# Patient Record
Sex: FEMALE | Race: WHITE | NOT HISPANIC OR LATINO | Employment: UNEMPLOYED | URBAN - METROPOLITAN AREA
[De-identification: names, ages, dates, MRNs, and addresses within clinical notes are randomized per-mention and may not be internally consistent; named-entity substitution may affect disease eponyms.]

---

## 2017-01-04 ENCOUNTER — HOSPITAL ENCOUNTER (EMERGENCY)
Facility: HOSPITAL | Age: 27
Discharge: HOME | End: 2017-01-04
Payer: MEDICAID

## 2017-01-04 VITALS
SYSTOLIC BLOOD PRESSURE: 131 MMHG | OXYGEN SATURATION: 100 % | RESPIRATION RATE: 20 BRPM | HEIGHT: 66 IN | BODY MASS INDEX: 17.68 KG/M2 | HEART RATE: 99 BPM | TEMPERATURE: 98 F | WEIGHT: 110 LBS | DIASTOLIC BLOOD PRESSURE: 74 MMHG

## 2017-01-04 DIAGNOSIS — L29.9 PRURITIC CONDITION: ICD-10-CM

## 2017-01-04 DIAGNOSIS — T50.905A ADVERSE REACTION TO DRUG, INITIAL ENCOUNTER: Primary | ICD-10-CM

## 2017-01-04 PROCEDURE — 99283 EMERGENCY DEPT VISIT LOW MDM: CPT

## 2017-01-04 NOTE — ED AVS SNAPSHOT
[facility] [address] [place] [address]               Noemi Morales   2017 11:53 AM   ED    Description:  Female : 1990   Department:  [facility]           Your Care was Coordinated By:     Provider Role From To    Roshan Perez PA-C Physician Assistant [phone] --      Reason for Visit     Allergic Reaction           Diagnoses this Visit        Comments    Adverse reaction to drug, initial encounter    -  Primary     Pruritic condition           ED Disposition     ED Disposition Condition Comment    Discharge             To Do List           Follow-up Information     Follow up with [facility].    Specialty:  Emergency Medicine    Why:  If symptoms worsen in any way. Do not take any medication that you may be allergic to. Follow up with your OB/GYN clinic in the next 1-2 days for re-evaluation and further management. Take Benadryl as directed as needed.     Contact information:    [address] [place] [address] [id number] [phone]      [facility]     [facility] -  Assistance  Registered nurses in the [facility] provide clinical advisement, health education, appointment booking, and other advisory services.  Call for this free service at [phone].             Medications           Message regarding Medications     Verify the changes and/or additions to your medication regime listed below are the same as discussed with your clinician today.  If any of these changes or additions are incorrect, please notify your healthcare provider.        STOP taking these medications     acetaminophen-codeine 300-30mg (TYLENOL #3) 300-30 mg Tab Take 1 tablet by mouth every 6 (six) hours as needed.    misoprostol (CYTOTEC) 200 MCG Tab Take 2 tablets (400 mcg total) by mouth every 4 (four) hours.    oxycodone-acetaminophen (PERCOCET) 5-325 mg per  "tablet Take 1 tablet by mouth every 4 (four) hours as needed for Pain.    promethazine (PHENERGAN) 25 MG tablet Take 1 tablet (25 mg total) by mouth every 4 (four) hours as needed for Nausea.           Verify that the below list of medications is an accurate representation of the medications you are currently taking.  If none reported, the list may be blank. If incorrect, please contact your healthcare provider. Carry this list with you in case of emergency.           Current Medications     lamotrigine (LAMICTAL) 200 MG tablet Take 1 tablet (200 mg total) by mouth 2 (two) times daily.    naproxen (NAPROSYN) 500 MG tablet Take 1 tablet (500 mg total) by mouth 2 (two) times daily with meals.           Clinical Reference Information           Your Vitals Were     BP Pulse Temp Resp Height Weight    131/74 (BP Location: Right arm, Patient Position: Sitting) 99 98.1 °F (36.7 °C) (Oral) 20 5' 6" (1.676 m) 49.9 kg (110 lb)    Last Period SpO2 BMI          10/08/2016 100% 17.75 kg/m2        Allergies as of 1/4/2017        Reactions    Sulfa (Sulfonamide Antibiotics) Rash      Immunizations Administered on Date of Encounter - 1/4/2017     None      ED Micro, Lab, POCT     None      ED Imaging Orders     None      Discharge References/Attachments     ALLERGIC REACTION, DRUG (ENGLISH)    HIVES (URTICARIA) UNDERSTANDING (ENGLISH)      Smoking Cessation     If you would like to quit smoking:   You may be eligible for free services if you are a [place] resident and started smoking cigarettes before September 1, 1988.  Call the Smoking Cessation [employer] ([facility]) toll free at [phone] [phone] or [phone] [phone].   Call 5-366-QUIT-NOW if you do not meet the above criteria.             [facility]  complies with applicable Federal civil rights laws and does not discriminate on the basis of race, color, national origin, age, disability, or sex.        Language Assistance Services     ATTENTION: Language assistance services " are available, free of charge. Please call [phone].      ATENCIÓN: Si habla español, tiene a vincent disposición servicios gratuitos de asistencia lingüística. Llame al [phone].     CHÚ Ý: N?u b?n nói Ti?ng Vi?t, có các d?ch v? h? tr? ngôn ng? mi?n phí dành cho b?n. G?i s? [phone].

## 2017-01-04 NOTE — ED NOTES
Pt examined by alla Henderson without RN, educated on prescriptions, given discharge instructions and discharged to [facility].  See provider notes for exam

## 2017-01-04 NOTE — ED PROVIDER NOTES
"Encounter Date: 2017       History     Chief Complaint   Patient presents with    Allergic Reaction     Pt states, "I think I'm having a reaction to a medication they gave me".     Review of patient's allergies indicates:   Allergen Reactions    Sulfa (sulfonamide antibiotics) Rash     HPI Comments: The patient presents to the ER c/o itching and hives. She states that the symptoms began last night after she took Naproxen for the first time. She states that she is taking Naproxen as prescribed for cramping s/p D & C on 16. She states that the itching was to her trunk, buttocks, and to her lower extremities. She states that she took Benadryl x 2 today and her symptoms have improved. She denies any swelling to her lips, face, tongue, or throat.     Past Medical History   Diagnosis Date    Bipolar 1 disorder     Scoliosis      No past medical history pertinent negatives.  Past Surgical History   Procedure Laterality Date     section  10/20/2015    Dilation and curettage of uterus       Family History   Problem Relation Age of Onset    No Known Problems Mother     Breast cancer Neg Hx     Colon cancer Neg Hx     Ovarian cancer Neg Hx      Social History   Substance Use Topics    Smoking status: Current Every Day Smoker    Smokeless tobacco: Never Used    Alcohol use No     Review of Systems   Constitutional: Negative for activity change, chills, diaphoresis and fever.   HENT: Negative for drooling, facial swelling, sore throat, trouble swallowing and voice change.    Eyes: Negative for discharge, redness, itching and visual disturbance.   Respiratory: Negative for apnea, cough, chest tightness, shortness of breath, wheezing and stridor.    Cardiovascular: Negative for chest pain, palpitations and leg swelling.   Gastrointestinal: Negative for abdominal pain, diarrhea, nausea and vomiting.   Genitourinary: Negative for decreased urine volume, menstrual problem and pelvic pain. "   Musculoskeletal: Negative for arthralgias, gait problem and myalgias.   Skin: Positive for rash.   Allergic/Immunologic: Negative for immunocompromised state.   Neurological: Negative for dizziness, tremors, seizures, syncope, speech difficulty, weakness, light-headedness and headaches.   Psychiatric/Behavioral: Negative for agitation, behavioral problems, confusion and suicidal ideas. The patient is not nervous/anxious.        Physical Exam   Initial Vitals   BP Pulse Resp Temp SpO2   01/04/17 1147 01/04/17 1147 01/04/17 1147 01/04/17 1147 01/04/17 1147   131/74 99 20 98.1 °F (36.7 °C) 100 %     Physical Exam    Nursing note and vitals reviewed.  Constitutional: She appears well-developed and well-nourished. She is not diaphoretic. No distress.   HENT:   Mouth/Throat: Oropharynx is clear and moist.   No angioedema. Normal speech. Patent airway.    Eyes: Conjunctivae are normal. Pupils are equal, round, and reactive to light. Right eye exhibits no discharge. Left eye exhibits no discharge.   Sclera white. No periorbital swelling.    Cardiovascular: Normal rate, regular rhythm and intact distal pulses.   Pulmonary/Chest: Breath sounds normal. No respiratory distress. She has no wheezes.   Abdominal: Soft. There is no tenderness. There is no guarding.   Musculoskeletal: Normal range of motion. She exhibits no edema.   Neurological: She is alert and oriented to person, place, and time. She has normal strength. No cranial nerve deficit.   Skin: Skin is warm and dry. No rash noted.   Donna DOWNEY present throughout entire exam as a chaperone. Patient has no visible or palpable rash, hives, cellulitis, urticaria to lower extremities, torso, back, or buttock. Normal skin exam. No excoriation. No swelling. Non-tender. No edema.    Psychiatric: She has a normal mood and affect. Her behavior is normal.         ED Course   Procedures  Labs Reviewed - No data to display          Medical Decision Making:   Initial Assessment:   Pt  reports itching and hives since last night after taking Naproxen. She states that her itching and rash improved after taking Benadryl. No angioedema. No rash presently.   ED Management:  I advised the patient to DC Naproxen.   I advised the patient to take Tylenol as directed as needed for pain.   I advised the patient to take Benadryl as directed as needed for itching and allergies.   I advised the patient to follow up closely with her OB clinic for re-evaluation and further management.   She agrees to return to the ER if worse in any way.                    ED Course     Clinical Impression:   The primary encounter diagnosis was Adverse reaction to drug, initial encounter. A diagnosis of Pruritic condition was also pertinent to this visit.    Disposition:   Disposition: Discharged  Condition: Stable       Roshan Perez PA-C  01/04/17 121

## 2017-01-12 ENCOUNTER — HOSPITAL ENCOUNTER (EMERGENCY)
Facility: HOSPITAL | Age: 27
Discharge: HOME | End: 2017-01-12
Payer: MEDICAID

## 2017-01-12 VITALS
HEART RATE: 71 BPM | WEIGHT: 110 LBS | BODY MASS INDEX: 17.68 KG/M2 | RESPIRATION RATE: 14 BRPM | DIASTOLIC BLOOD PRESSURE: 68 MMHG | SYSTOLIC BLOOD PRESSURE: 107 MMHG | OXYGEN SATURATION: 100 % | TEMPERATURE: 98 F | HEIGHT: 66 IN

## 2017-01-12 DIAGNOSIS — N93.8 DYSFUNCTIONAL UTERINE BLEEDING: Primary | ICD-10-CM

## 2017-01-12 LAB
ABO + RH BLD: NORMAL
ALBUMIN SERPL BCP-MCNC: 4.2 G/DL
ALP SERPL-CCNC: 62 U/L
ALT SERPL W/O P-5'-P-CCNC: 13 U/L
ANION GAP SERPL CALC-SCNC: 9 MMOL/L
AST SERPL-CCNC: 15 U/L
B-HCG UR QL: NEGATIVE
BACTERIA #/AREA URNS HPF: ABNORMAL /HPF
BASOPHILS # BLD AUTO: 0.03 K/UL
BASOPHILS NFR BLD: 0.5 %
BILIRUB SERPL-MCNC: 0.4 MG/DL
BILIRUB UR QL STRIP: NEGATIVE
BLD GP AB SCN CELLS X3 SERPL QL: NORMAL
BUN SERPL-MCNC: 5 MG/DL
CALCIUM SERPL-MCNC: 9 MG/DL
CHLORIDE SERPL-SCNC: 107 MMOL/L
CLARITY UR: CLEAR
CO2 SERPL-SCNC: 22 MMOL/L
COLOR UR: ABNORMAL
CREAT SERPL-MCNC: 0.8 MG/DL
DIFFERENTIAL METHOD: ABNORMAL
EOSINOPHIL # BLD AUTO: 0.1 K/UL
EOSINOPHIL NFR BLD: 2.2 %
ERYTHROCYTE [DISTWIDTH] IN BLOOD BY AUTOMATED COUNT: 13.1 %
EST. GFR  (AFRICAN AMERICAN): >60 ML/MIN/1.73 M^2
EST. GFR  (NON AFRICAN AMERICAN): >60 ML/MIN/1.73 M^2
GLUCOSE SERPL-MCNC: 72 MG/DL
GLUCOSE UR QL STRIP: NEGATIVE
HCG INTACT+B SERPL-ACNC: 25 MIU/ML
HCT VFR BLD AUTO: 39.6 %
HGB BLD-MCNC: 13.2 G/DL
HGB UR QL STRIP: ABNORMAL
HYALINE CASTS #/AREA URNS LPF: 0 /LPF
KETONES UR QL STRIP: NEGATIVE
LEUKOCYTE ESTERASE UR QL STRIP: NEGATIVE
LYMPHOCYTES # BLD AUTO: 1.3 K/UL
LYMPHOCYTES NFR BLD: 20.2 %
MCH RBC QN AUTO: 31.3 PG
MCHC RBC AUTO-ENTMCNC: 33.3 %
MCV RBC AUTO: 94 FL
MICROSCOPIC COMMENT: ABNORMAL
MONOCYTES # BLD AUTO: 0.5 K/UL
MONOCYTES NFR BLD: 7.6 %
NEUTROPHILS # BLD AUTO: 4.5 K/UL
NEUTROPHILS NFR BLD: 69.5 %
NITRITE UR QL STRIP: NEGATIVE
PH UR STRIP: 8 [PH] (ref 5–8)
PLATELET # BLD AUTO: 177 K/UL
PMV BLD AUTO: 12 FL
POTASSIUM SERPL-SCNC: 4 MMOL/L
PROT SERPL-MCNC: 6.8 G/DL
PROT UR QL STRIP: ABNORMAL
RBC # BLD AUTO: 4.22 M/UL
RBC #/AREA URNS HPF: >100 /HPF (ref 0–4)
SODIUM SERPL-SCNC: 138 MMOL/L
SP GR UR STRIP: 1.01 (ref 1–1.03)
SQUAMOUS #/AREA URNS HPF: 5 /HPF
URN SPEC COLLECT METH UR: ABNORMAL
UROBILINOGEN UR STRIP-ACNC: NEGATIVE EU/DL
WBC # BLD AUTO: 6.45 K/UL
WBC #/AREA URNS HPF: 2 /HPF (ref 0–5)

## 2017-01-12 PROCEDURE — 86900 BLOOD TYPING SEROLOGIC ABO: CPT

## 2017-01-12 PROCEDURE — 85025 COMPLETE CBC W/AUTO DIFF WBC: CPT

## 2017-01-12 PROCEDURE — 80053 COMPREHEN METABOLIC PANEL: CPT

## 2017-01-12 PROCEDURE — 25000003 PHARM REV CODE 250: Performed by: PHYSICIAN ASSISTANT

## 2017-01-12 PROCEDURE — 63600175 PHARM REV CODE 636 W HCPCS: Performed by: PHYSICIAN ASSISTANT

## 2017-01-12 PROCEDURE — 81000 URINALYSIS NONAUTO W/SCOPE: CPT

## 2017-01-12 PROCEDURE — 96374 THER/PROPH/DIAG INJ IV PUSH: CPT

## 2017-01-12 PROCEDURE — 86850 RBC ANTIBODY SCREEN: CPT

## 2017-01-12 PROCEDURE — 96375 TX/PRO/DX INJ NEW DRUG ADDON: CPT

## 2017-01-12 PROCEDURE — 84702 CHORIONIC GONADOTROPIN TEST: CPT

## 2017-01-12 PROCEDURE — 81025 URINE PREGNANCY TEST: CPT

## 2017-01-12 PROCEDURE — 96361 HYDRATE IV INFUSION ADD-ON: CPT

## 2017-01-12 PROCEDURE — 99284 EMERGENCY DEPT VISIT MOD MDM: CPT | Mod: 25

## 2017-01-12 RX ORDER — MORPHINE SULFATE 2 MG/ML
2 INJECTION, SOLUTION INTRAMUSCULAR; INTRAVENOUS
Status: COMPLETED | OUTPATIENT
Start: 2017-01-12 | End: 2017-01-12

## 2017-01-12 RX ORDER — ACETAMINOPHEN 500 MG
1000 TABLET ORAL 3 TIMES DAILY PRN
Qty: 30 TABLET | Refills: 0
Start: 2017-01-12 | End: 2017-01-23

## 2017-01-12 RX ORDER — MEDROXYPROGESTERONE ACETATE 5 MG/1
10 TABLET ORAL DAILY
Qty: 10 TABLET | Refills: 0 | Status: SHIPPED | OUTPATIENT
Start: 2017-01-12 | End: 2017-01-19 | Stop reason: ALTCHOICE

## 2017-01-12 RX ORDER — DICLOFENAC SODIUM 75 MG/1
75 TABLET, DELAYED RELEASE ORAL 2 TIMES DAILY
Qty: 20 TABLET | Refills: 0 | Status: SHIPPED | OUTPATIENT
Start: 2017-01-12 | End: 2017-01-23

## 2017-01-12 RX ORDER — IBUPROFEN 200 MG
400 TABLET ORAL EVERY 6 HOURS PRN
Qty: 30 TABLET | Refills: 0
Start: 2017-01-12 | End: 2017-01-23

## 2017-01-12 RX ORDER — KETOROLAC TROMETHAMINE 30 MG/ML
30 INJECTION, SOLUTION INTRAMUSCULAR; INTRAVENOUS
Status: COMPLETED | OUTPATIENT
Start: 2017-01-12 | End: 2017-01-12

## 2017-01-12 RX ADMIN — MORPHINE SULFATE 2 MG: 2 INJECTION, SOLUTION INTRAMUSCULAR; INTRAVENOUS at 12:01

## 2017-01-12 RX ADMIN — SODIUM CHLORIDE 1000 ML: 0.9 INJECTION, SOLUTION INTRAVENOUS at 12:01

## 2017-01-12 RX ADMIN — KETOROLAC TROMETHAMINE 30 MG: 30 INJECTION, SOLUTION INTRAMUSCULAR at 02:01

## 2017-01-12 NOTE — ED NOTES
Pt reports heavy vaginal bleeding this morning and also bleeding from nose.  Pt has been bleeding for past 2mo, had a miscarriage and then D & C 3 weeks ago.  Pt also reports lower abdominal cramping.  Pain is worse to L side

## 2017-01-12 NOTE — ED NOTES
Patient sitting up in bed reports return of abdominal cramping 8/10. Pt remains awake, alert, and oriented x 3, calm, respirations even and unlabored, and skin is warm and dry. Patient verbalized understanding of status and plan of care. Side rails up x 2, call light in reach, bed low and locked. Will continue to monitor.  KAYLA Taylor to be notified

## 2017-01-12 NOTE — ED PROVIDER NOTES
SCRIBE #1 NOTE: I, Monty Wolff, am scribing for, and in the presence of, KAYLA Oneill. I have scribed the entire note.      History      Chief Complaint   Patient presents with    Vaginal Bleeding     heavy vaginal bleeding that began this morning, bleeding through pants       Review of patient's allergies indicates:   Allergen Reactions    Sulfa (sulfonamide antibiotics) Rash        HPI   HPI    2017, 12:08 PM   History obtained from the patient      History of Present Illness: Noemi Morales is a 26 y.o. female patient who presents to the Emergency Department for vaginal bleeding which onset gradually 2 months ago worsening over the last 3 days. Symptoms are interemittent and moderate in severity. Sx are exacerbated by nothing and relieved by nothing. Pt reports having a DNC done for a blighted ovum 3 weeks ago and has continued to have vaginal bleeding since. Pt reports soaking through 1 menstrual pad per hour for the last 24 hours. Associated sxs include lightheadedness and dizziness. Patient denies any fever, N/V/D, chills, HA, weakness/numbness, abd pain, dysuria, difficulty urinating, pelvic pain, vaginal pain and all other sxs at this time. No further complaints or concerns at this time.       Arrival mode: Personal vehicle     PCP: Jace Yadav MD       Past Medical History:  Past Medical History   Diagnosis Date    Bipolar 1 disorder     Scoliosis        Past Surgical History:  Past Surgical History   Procedure Laterality Date     section  10/20/2015    Dilation and curettage of uterus           Family History:  Family History   Problem Relation Age of Onset    No Known Problems Mother     Breast cancer Neg Hx     Colon cancer Neg Hx     Ovarian cancer Neg Hx        Social History:  Social History     Social History Main Topics    Smoking status: Current Every Day Smoker    Smokeless tobacco: Never Used    Alcohol use No    Drug use: No    Sexual  activity: Yes       ROS   Review of Systems   Constitutional: Negative for chills and fever.   HENT: Negative for congestion and sore throat.    Respiratory: Negative for chest tightness and shortness of breath.    Cardiovascular: Negative for chest pain.   Gastrointestinal: Negative for abdominal pain, nausea and vomiting.   Genitourinary: Positive for vaginal bleeding. Negative for decreased urine volume, difficulty urinating, dysuria, frequency, pelvic pain, urgency, vaginal discharge and vaginal pain.   Musculoskeletal: Negative for back pain and neck pain.   Skin: Negative for rash.   Neurological: Positive for dizziness and light-headedness. Negative for weakness, numbness and headaches.   Psychiatric/Behavioral: Negative for agitation and confusion.   All other systems reviewed and are negative.      Physical Exam    Initial Vitals   BP Pulse Resp Temp SpO2   01/12/17 1146 01/12/17 1146 01/12/17 1146 01/12/17 1146 01/12/17 1146   143/86 108 18 98 °F (36.7 °C) 99 %      Physical Exam  Nursing Notes and Vital Signs Reviewed.  Constitutional: Patient is in mild distress secondary to pain. Awake and alert. Well-developed and well-nourished.   Head: Atraumatic. Normocephalic.  Eyes: PERRL. EOM intact. Conjunctivae are not pale. No scleral icterus.  ENT: Mucous membranes are moist. Oropharynx is clear and symmetric.    Neck: Supple. Full ROM. No JVD.  Cardiovascular: Regular rate. Regular rhythm. No murmurs, rubs, or gallops. Distal pulses are 2+ and symmetric.  Pulmonary/Chest: No respiratory distress. Clear to auscultation bilaterally. No wheezing, rales, or rhonchi.  Abdominal: Soft and non-distended.  There is suprapubic tenderness.  No rebound, guarding, or rigidity.  Good bowel sounds.    Musculoskeletal: Moves all extremities. No obvious deformities. No edema. No calf tenderness.  Skin: Warm and dry.  Neurological:  Alert, awake, and appropriate.  Normal speech.  No acute focal neurological deficits are  "appreciated.  Psychiatric: Normal affect. Good eye contact. Appropriate in content.    ED Course    Procedures  ED Vital Signs:  Vitals:    01/12/17 1146 01/12/17 1226 01/12/17 1227 01/12/17 1228   BP: (!) 143/86 108/74 117/82 107/81   Pulse: 108 85 95 109   Resp: 18      Temp: 98 °F (36.7 °C)      TempSrc: Oral      SpO2: 99%      Weight: 49.9 kg (110 lb)      Height: 5' 6" (1.676 m)       01/12/17 1307   BP: 102/69   Pulse: 80   Resp: 14   Temp:    TempSrc:    SpO2: 100%   Weight:    Height:        Abnormal Lab Results:  Labs Reviewed   CBC W/ AUTO DIFFERENTIAL - Abnormal; Notable for the following:        Result Value    MCH 31.3 (*)     All other components within normal limits   COMPREHENSIVE METABOLIC PANEL - Abnormal; Notable for the following:     CO2 22 (*)     BUN, Bld 5 (*)     All other components within normal limits   URINALYSIS - Abnormal; Notable for the following:     Color, UA Red (*)     Protein, UA 2+ (*)     Occult Blood UA 3+ (*)     All other components within normal limits   URINALYSIS MICROSCOPIC - Abnormal; Notable for the following:     RBC, UA >100 (*)     All other components within normal limits   HCG, QUANTITATIVE, PREGNANCY   PREGNANCY TEST, URINE RAPID   TYPE & SCREEN        All Lab Results:  Results for orders placed or performed during the hospital encounter of 01/12/17   CBC W/ AUTO DIFFERENTIAL   Result Value Ref Range    WBC 6.45 3.90 - 12.70 K/uL    RBC 4.22 4.00 - 5.40 M/uL    Hemoglobin 13.2 12.0 - 16.0 g/dL    Hematocrit 39.6 37.0 - 48.5 %    MCV 94 82 - 98 fL    MCH 31.3 (H) 27.0 - 31.0 pg    MCHC 33.3 32.0 - 36.0 %    RDW 13.1 11.5 - 14.5 %    Platelets 177 150 - 350 K/uL    MPV 12.0 9.2 - 12.9 fL    Gran # 4.5 1.8 - 7.7 K/uL    Lymph # 1.3 1.0 - 4.8 K/uL    Mono # 0.5 0.3 - 1.0 K/uL    Eos # 0.1 0.0 - 0.5 K/uL    Baso # 0.03 0.00 - 0.20 K/uL    Gran% 69.5 38.0 - 73.0 %    Lymph% 20.2 18.0 - 48.0 %    Mono% 7.6 4.0 - 15.0 %    Eosinophil% 2.2 0.0 - 8.0 %    Basophil% 0.5 " 0.0 - 1.9 %    Differential Method Automated    Comp. Metabolic Panel   Result Value Ref Range    Sodium 138 136 - 145 mmol/L    Potassium 4.0 3.5 - 5.1 mmol/L    Chloride 107 95 - 110 mmol/L    CO2 22 (L) 23 - 29 mmol/L    Glucose 72 70 - 110 mg/dL    BUN, Bld 5 (L) 6 - 20 mg/dL    Creatinine 0.8 0.5 - 1.4 mg/dL    Calcium 9.0 8.7 - 10.5 mg/dL    Total Protein 6.8 6.0 - 8.4 g/dL    Albumin 4.2 3.5 - 5.2 g/dL    Total Bilirubin 0.4 0.1 - 1.0 mg/dL    Alkaline Phosphatase 62 55 - 135 U/L    AST 15 10 - 40 U/L    ALT 13 10 - 44 U/L    Anion Gap 9 8 - 16 mmol/L    eGFR if African American >60 >60 mL/min/1.73 m^2    eGFR if non African American >60 >60 mL/min/1.73 m^2   hCG, quantitative   Result Value Ref Range    hCG Quant 25 See Text mIU/mL   Pregnancy, urine rapid   Result Value Ref Range    Preg Test, Ur Negative    Urinalysis   Result Value Ref Range    Specimen UA Urine, Clean Catch     Color, UA Red (A) Yellow, Straw, Amaris    Appearance, UA Clear Clear    pH, UA 8.0 5.0 - 8.0    Specific Gravity, UA 1.010 1.005 - 1.030    Protein, UA 2+ (A) Negative    Glucose, UA Negative Negative    Ketones, UA Negative Negative    Bilirubin (UA) Negative Negative    Occult Blood UA 3+ (A) Negative    Nitrite, UA Negative Negative    Urobilinogen, UA Negative <2.0 EU/dL    Leukocytes, UA Negative Negative   Urinalysis Microscopic   Result Value Ref Range    RBC, UA >100 (H) 0 - 4 /hpf    WBC, UA 2 0 - 5 /hpf    Bacteria, UA Occasional None-Occ /hpf    Squam Epithel, UA 5 /hpf    Hyaline Casts, UA 0 0-1/lpf /lpf    Microscopic Comment SEE COMMENT    Type & Screen   Result Value Ref Range    Group & Rh B POS     Indirect Tunde NEG             The Emergency Provider reviewed the vital signs and test results, which are outlined above.    ED Discussion     1:59 PM: Reassessed pt at this time. Discussed with pt all pertinent ED information and results. Discussed pt dx and plan of tx. Gave pt all f/u and return to the ED  instructions. All questions and concerns were addressed at this time. Pt expresses understanding of information and instructions, and is comfortable with plan to discharge. Pt is stable for discharge.    I discussed with patient and/or family/caretaker that evaluation in the ED does not suggest any emergent or life threatening medical conditions requiring immediate intervention beyond what was provided in the ED, and I believe patient is safe for discharge.  Regardless, an unremarkable evaluation in the ED does not preclude the development or presence of a serious of life threatening condition. As such, patient was instructed to return immediately for any worsening or change in current symptoms.    Pre-hypertension/Hypertension: The pt has been informed that they may have pre-hypertension or hypertension based on a blood pressure reading in the ED. I recommend that the pt call the PCP listed on their discharge instructions or a physician of their choice this week to arrange f/u for further evaluation of possible pre-hypertension or hypertension.         ED Medication(s):  Medications   sodium chloride 0.9% bolus 1,000 mL (1,000 mLs Intravenous New Bag 1/12/17 1232)   morphine injection 2 mg (2 mg Intravenous Given 1/12/17 1244)       New Prescriptions    ACETAMINOPHEN (TYLENOL) 500 MG TABLET    Take 2 tablets (1,000 mg total) by mouth 3 (three) times daily as needed for Pain.    DICLOFENAC (VOLTAREN) 75 MG EC TABLET    Take 1 tablet (75 mg total) by mouth 2 (two) times daily.    IBUPROFEN (ADVIL,MOTRIN) 200 MG TABLET    Take 2 tablets (400 mg total) by mouth every 6 (six) hours as needed for Pain.    MEDROXYPROGESTERONE (PROVERA) 5 MG TABLET    Take 2 tablets (10 mg total) by mouth once daily.       Follow-up Information     Follow up with Jace Yadav MD In 1 week(s).    Specialty:  Internal Medicine    Contact information:    [address] [place] [address] [phone]              Medical  Decision Making    Medical Decision Making:   Clinical Tests:   Lab Tests: Ordered and Reviewed           Scribe Attestation:   Scribe #1: I performed the above scribed service and the documentation accurately describes the services I performed. I attest to the accuracy of the note.    Attending:   Physician Attestation Statement for Scribe #1: I, KAYLA Oneill, personally performed the services described in this documentation, as scribed by Monty Wolff, in my presence, and it is both accurate and complete.          Clinical Impression       ICD-10-CM ICD-9-CM   1. Dysfunctional uterine bleeding N93.8 626.8       Disposition:   Disposition: Discharged  Condition: Stable         KAYLA Low  01/12/17 1400

## 2017-01-12 NOTE — ED AVS SNAPSHOT
[facility]  [address]  [place] [address]               Noemi Morales   2017 11:41 AM   ED    Description:  Female : 1990   Department:  [facility]           Your Care was Coordinated By:     Provider Role From To    KAYLA Low Physician Assistant [phone] --      Reason for Visit     Vaginal Bleeding           Diagnoses this Visit        Comments    Dysfunctional uterine bleeding    -  Primary       ED Disposition     ED Disposition Condition Comment    Discharge             To Do List           Follow-up Information     Follow up with Jace Yadav MD In 1 week(s).    Specialty:  Internal Medicine    Contact information:    [address] [facility] [id number] [phone]         These Medications        Disp Refills Start End    ibuprofen (ADVIL,MOTRIN) 200 MG tablet 30 tablet 0 2017     Take 2 tablets (400 mg total) by mouth every 6 (six) hours as needed for Pain. - Oral    Pharmacy: [address] & [facility] Ph #: [phone]       acetaminophen (TYLENOL) 500 MG tablet 30 tablet 0 2017     Take 2 tablets (1,000 mg total) by mouth 3 (three) times daily as needed for Pain. - Oral    Pharmacy: [address] & [facility] Ph #: [phone]       medroxyPROGESTERone (PROVERA) 5 MG tablet 10 tablet 0 2017    Take 2 tablets (10 mg total) by mouth once daily. - Oral    Pharmacy: [address] & [facility] Ph #: [phone]       diclofenac (VOLTAREN) 75 MG EC tablet 20 tablet 0 2017     Take 1 tablet (75 mg total) by mouth 2 (two) times daily. - Oral    Pharmacy: [place] [id number] [facility]  [employer]  #: [phone]         [facility]     [facility] - 24/7 Assistance  Registered nurses in the [facility] provide clinical advisement, health education, appointment booking, and other advisory services.  Call for this free service at [phone].             Medications           Message regarding Medications     Verify the changes and/or additions to your medication regime listed below are the same as discussed with your clinician today.  If any of these changes or additions are incorrect, please notify your healthcare provider.        START taking these NEW medications        Refills    ibuprofen (ADVIL,MOTRIN) 200 MG tablet 0    Sig: Take 2 tablets (400 mg total) by mouth every 6 (six) hours as needed for Pain.    Class: No Print    Route: Oral    acetaminophen (TYLENOL) 500 MG tablet 0    Sig: Take 2 tablets (1,000 mg total) by mouth 3 (three) times daily as needed for Pain.    Class: No Print    Route: Oral    medroxyPROGESTERone (PROVERA) 5 MG tablet 0    Sig: Take 2 tablets (10 mg total) by mouth once daily.    Class: Print    Route: Oral    diclofenac (VOLTAREN) 75 MG EC tablet 0    Sig: Take 1 tablet (75 mg total) by mouth 2 (two) times daily.    Class: Print    Route: Oral      These medications were administered today        Dose Freq    sodium chloride 0.9% bolus 1,000 mL 1,000 mL ED 1 Time    Sig: Inject 1,000 mLs into the vein ED 1 Time.    Class: Normal    Route: Intravenous    Cosign for Ordering: Accepted by Pollo Reyes MD on 1/12/2017 12:54 PM    morphine injection 2 mg 2 mg ED 1 Time    Sig: Inject 1 mL (2 mg total) into the vein ED 1 Time.    Class: Normal    Route: Intravenous    Cosign for Ordering: Accepted by Pollo Reyes MD on 1/12/2017 12:54 PM      STOP taking these medications     naproxen (NAPROSYN) 500 MG tablet Take 1 tablet (500 mg total) by mouth 2 (two) times daily with meals.           Verify that the below list  "of medications is an accurate representation of the medications you are currently taking.  If none reported, the list may be blank. If incorrect, please contact your healthcare provider. Carry this list with you in case of emergency.           Current Medications     lamotrigine (LAMICTAL) 200 MG tablet Take 1 tablet (200 mg total) by mouth 2 (two) times daily.    acetaminophen (TYLENOL) 500 MG tablet Take 2 tablets (1,000 mg total) by mouth 3 (three) times daily as needed for Pain.    diclofenac (VOLTAREN) 75 MG EC tablet Take 1 tablet (75 mg total) by mouth 2 (two) times daily.    ibuprofen (ADVIL,MOTRIN) 200 MG tablet Take 2 tablets (400 mg total) by mouth every 6 (six) hours as needed for Pain.    medroxyPROGESTERone (PROVERA) 5 MG tablet Take 2 tablets (10 mg total) by mouth once daily.           Clinical Reference Information           Your Vitals Were     BP Pulse Temp Resp Height Weight    102/69 (BP Location: Left arm, Patient Position: Sitting, BP Method: Automatic) 80 98 °F (36.7 °C) (Oral) 14 5' 6" (1.676 m) 49.9 kg (110 lb)    Last Period SpO2 BMI          10/08/2016 100% 17.75 kg/m2        Allergies as of 1/12/2017        Reactions    Sulfa (Sulfonamide Antibiotics) Rash      Immunizations Administered on Date of Encounter - 1/12/2017     None      ED Micro, Lab, POCT     Start Ordered       Status Ordering Provider    01/12/17 1215 01/12/17 1215  CBC W/ AUTO DIFFERENTIAL  STAT      Final result     01/12/17 1215 01/12/17 1215  Comp. Metabolic Panel  STAT      Final result     01/12/17 1215 01/12/17 1215  hCG, quantitative  STAT      Final result     01/12/17 1215 01/12/17 1215  Pregnancy, urine rapid  STAT      Final result     01/12/17 1215 01/12/17 1215  Urinalysis  STAT      Final result     01/12/17 1215 01/12/17 1215  Urinalysis Microscopic  Once      Final result       ED Imaging Orders     None        Discharge Instructions           Dysfunctional Uterine Bleeding    Dysfunctional uterine " bleeding is a condition in which bleeding is abnormal and occurs at unexpected times of the month. This happens due to changes in the hormones that help control a womans menstrual cycle each month.  The bleeding may be heavier or lighter than normal. If you have heavy bleeding often, this can lead to a problem called anemia. With anemia, your red blood cell count is too low. Red blood cells are needed because they help carry oxygen throughout your body. Severe anemia may cause you to look pale and feel very weak or tired. You might also become short of breath easily.  To treat dysfunctional uterine bleeding, medicines are often tried first. If these dont help, further testing and treatments may be needed. Discuss all of your options with your provider.  Home care  Medicines  If youre prescribed medicines, be sure to take them as directed. Some of the more common medicines you may be prescribed include:  · Hormone therapy (Options include most methods of hormonal birth control such as pills, shots, or a hormone-releasing IUD)  · Nonsteroidal anti-inflammatory drugs (NSAIDs), such as ibuprofen  · Iron supplements, if you have anemia     General care  · Get plenty of rest if you tire easily. Avoid heavy exertion.  · To help relieve pain or cramping that may occur with bleeding, try using a heating pad on the lower belly or back. A warm bath may also help.  Follow-up care  Follow up with your healthcare provider as directed.  When to seek medical advice  Call your healthcare provider right away if:  · Bleeding becomes heavy (soaking 1 pad or tampon every hour for 3 hours)  · Increased abdominal pain  · Irregular bleeding worsens or does not get better even with treatment  · Fever of 100.4ºF (38ºC) or higher, or as directed by your provider  · Signs of anemia, such as pale skin, extreme fatigue or weakness, or shortness of breath  · Dizziness or fainting   © 3639-3122 [employer]. [address] [facility],  [place] [address]. All rights reserved. This information is not intended as a substitute for professional medical care. Always follow your healthcare professional's instructions.          Smoking Cessation     If you would like to quit smoking:   You may be eligible for free services if you are a [place] resident and started smoking cigarettes before September 1, 1988.  Call the Smoking Cessation Trust (SCT) toll free at (413) 996-2707 or (723) 139-3772.   Call 1-800-QUIT-NOW if you do not meet the above criteria.             [facility] complies with applicable Federal civil rights laws and does not discriminate on the basis of race, color, national origin, age, disability, or sex.        Language Assistance Services     ATTENTION: Language assistance services are available, free of charge. Please call [phone].      ATENCIÓN: Si habla español, tiene a vincent disposición servicios gratuitos de asistencia lingüística. Llame al [phone].     CHÚ Ý: N?u b?n nói Ti?ng Vi?t, có các d?ch v? h? tr? ngôn ng? mi?n phí dành cho b?n. G?i s? [phone].

## 2017-01-12 NOTE — ED NOTES
Patient sitting up in bed, no acute distress noted, awake, alert, and oriented x 3, calm, respirations even and unlabored, and skin is warm and dry. Patient reports relief of abdominal cramping to 4/10 and continues to report vaginal bleeding.  Pt erbalized understanding of status and plan of care for need for urine specimen and reports she is able to provide.  Pt assisted to bathroom ambulatory at this time. Will continue to monitor.

## 2017-01-12 NOTE — DISCHARGE INSTRUCTIONS
Dysfunctional Uterine Bleeding    Dysfunctional uterine bleeding is a condition in which bleeding is abnormal and occurs at unexpected times of the month. This happens due to changes in the hormones that help control a womans menstrual cycle each month.  The bleeding may be heavier or lighter than normal. If you have heavy bleeding often, this can lead to a problem called anemia. With anemia, your red blood cell count is too low. Red blood cells are needed because they help carry oxygen throughout your body. Severe anemia may cause you to look pale and feel very weak or tired. You might also become short of breath easily.  To treat dysfunctional uterine bleeding, medicines are often tried first. If these dont help, further testing and treatments may be needed. Discuss all of your options with your provider.  Home care  Medicines  If youre prescribed medicines, be sure to take them as directed. Some of the more common medicines you may be prescribed include:  · Hormone therapy (Options include most methods of hormonal birth control such as pills, shots, or a hormone-releasing IUD)  · Nonsteroidal anti-inflammatory drugs (NSAIDs), such as ibuprofen  · Iron supplements, if you have anemia     General care  · Get plenty of rest if you tire easily. Avoid heavy exertion.  · To help relieve pain or cramping that may occur with bleeding, try using a heating pad on the lower belly or back. A warm bath may also help.  Follow-up care  Follow up with your healthcare provider as directed.  When to seek medical advice  Call your healthcare provider right away if:  · Bleeding becomes heavy (soaking 1 pad or tampon every hour for 3 hours)  · Increased abdominal pain  · Irregular bleeding worsens or does not get better even with treatment  · Fever of 100.4ºF (38ºC) or higher, or as directed by your provider  · Signs of anemia, such as pale skin, extreme fatigue or weakness, or shortness of breath  · Dizziness or fainting   ©  6577-1856 [employer]. [address] [place] [address], [place] [address]. All rights reserved. This information is not intended as a substitute for professional medical care. Always follow your healthcare professional's instructions.

## 2017-01-19 ENCOUNTER — OFFICE VISIT (OUTPATIENT)
Dept: OBSTETRICS AND GYNECOLOGY | Facility: CLINIC | Age: 27
End: 2017-01-19
Payer: MEDICAID

## 2017-01-19 ENCOUNTER — PROCEDURE VISIT (OUTPATIENT)
Dept: OBSTETRICS AND GYNECOLOGY | Facility: CLINIC | Age: 27
End: 2017-01-19
Payer: MEDICAID

## 2017-01-19 VITALS
DIASTOLIC BLOOD PRESSURE: 68 MMHG | BODY MASS INDEX: 17.65 KG/M2 | HEIGHT: 66 IN | WEIGHT: 109.81 LBS | SYSTOLIC BLOOD PRESSURE: 100 MMHG

## 2017-01-19 DIAGNOSIS — N93.9 EPISODE OF HEAVY VAGINAL BLEEDING: ICD-10-CM

## 2017-01-19 DIAGNOSIS — O03.4 INCOMPLETE ABORTION: ICD-10-CM

## 2017-01-19 DIAGNOSIS — O03.4 INCOMPLETE ABORTION: Primary | ICD-10-CM

## 2017-01-19 PROCEDURE — 76816 OB US FOLLOW-UP PER FETUS: CPT | Mod: PBBFAC | Performed by: OBSTETRICS & GYNECOLOGY

## 2017-01-19 PROCEDURE — 99999 PR PBB SHADOW E&M-EST. PATIENT-LVL III: CPT | Mod: PBBFAC,,, | Performed by: OBSTETRICS & GYNECOLOGY

## 2017-01-19 PROCEDURE — 99213 OFFICE O/P EST LOW 20 MIN: CPT | Mod: TH,S$PBB,, | Performed by: OBSTETRICS & GYNECOLOGY

## 2017-01-19 PROCEDURE — 76816 OB US FOLLOW-UP PER FETUS: CPT | Mod: 26,S$PBB,, | Performed by: OBSTETRICS & GYNECOLOGY

## 2017-01-19 RX ORDER — OXYCODONE AND ACETAMINOPHEN 5; 325 MG/1; MG/1
1 TABLET ORAL EVERY 4 HOURS PRN
Qty: 20 TABLET | Refills: 0 | Status: SHIPPED | OUTPATIENT
Start: 2017-01-19 | End: 2017-01-23

## 2017-01-19 RX ORDER — PROMETHAZINE HYDROCHLORIDE 25 MG/1
25 TABLET ORAL EVERY 4 HOURS PRN
Qty: 20 TABLET | Refills: 0 | Status: SHIPPED | OUTPATIENT
Start: 2017-01-19 | End: 2017-01-19 | Stop reason: SDUPTHER

## 2017-01-19 RX ORDER — MISOPROSTOL 200 UG/1
200 TABLET ORAL EVERY 6 HOURS
Qty: 4 TABLET | Refills: 0 | Status: SHIPPED | OUTPATIENT
Start: 2017-01-19 | End: 2017-01-19 | Stop reason: SDUPTHER

## 2017-01-19 RX ORDER — OXYCODONE AND ACETAMINOPHEN 5; 325 MG/1; MG/1
1 TABLET ORAL EVERY 4 HOURS PRN
Qty: 20 TABLET | Refills: 0 | Status: SHIPPED | OUTPATIENT
Start: 2017-01-19 | End: 2017-01-19 | Stop reason: SDUPTHER

## 2017-01-19 RX ORDER — MISOPROSTOL 200 UG/1
200 TABLET ORAL EVERY 6 HOURS
Qty: 4 TABLET | Refills: 0 | Status: SHIPPED | OUTPATIENT
Start: 2017-01-19 | End: 2017-01-23

## 2017-01-19 RX ORDER — PROMETHAZINE HYDROCHLORIDE 25 MG/1
25 TABLET ORAL EVERY 4 HOURS PRN
Qty: 20 TABLET | Refills: 0 | Status: SHIPPED | OUTPATIENT
Start: 2017-01-19 | End: 2017-01-31

## 2017-01-19 NOTE — PROGRESS NOTES
Subjective:       Patient ID: Noemi Morales is a 26 y.o. female.    Chief Complaint:  Pelvic Pain      History of Present Illness  HPI  Presents with a second episode of heavy vaginal bleeding.  Patient took cytotec 16 for a blighted ovum.  She had an episode of heavy bleeding and cramping at that time, and thinks some tissue passed.  She never really stopped bleeding, so went to the ED 1 week ago where beta hcg was 25 and CBC was normal.  She was given a rx for provera, and has been taking that.  States that lightened her flow until today.  Today at work, she started cramping significantly, then started bleeding very heavily.  She soiled 4 different pairs of pants today.  Denies any dizziness, weakness, or lightheadedness.  No fevers.  + midline pelvic cramping.  No foul smelling vaginal discharge.    GYN & OB History  Patient's last menstrual period was 10/08/2016.   Date of Last Pap: 2016    OB History    Para Term  AB SAB TAB Ectopic Multiple Living   3 1 1  1     1      # Outcome Date GA Lbr Francisco/2nd Weight Sex Delivery Anes PTL Lv   3 Current            2 AB            1 Term     M CS-Unspec   Y          Review of Systems  Review of Systems   Constitutional: Negative for activity change, fatigue, fever and unexpected weight change.   Gastrointestinal: Negative for abdominal pain, bloating, constipation, diarrhea, nausea and vomiting.   Genitourinary: Positive for pelvic pain (cramping) and vaginal bleeding. Negative for dyspareunia, dysuria, frequency, genital sores, hematuria, menorrhagia, menstrual problem, urgency, vaginal discharge, vaginal pain, dysmenorrhea, postcoital bleeding and vaginal odor.   Skin:  Negative for rash and hair changes.           Objective:    Physical Exam:   Constitutional: She is oriented to person, place, and time. She appears well-developed and well-nourished. No distress.             Abdominal: Soft. She exhibits no distension and no mass.  There is no tenderness. There is no rebound and no guarding. Hernia confirmed negative in the right inguinal area and confirmed negative in the left inguinal area.     Genitourinary: Pelvic exam was performed with patient supine. There is no rash, tenderness, lesion or injury on the right labia. There is no rash, tenderness, lesion or injury on the left labia. Uterus is not deviated, not enlarged, not fixed, not tender and not experiencing uterine prolapse. Right adnexum displays no mass, no tenderness and no fullness. Left adnexum displays no mass, no tenderness and no fullness. There is bleeding (large clot in the vagina; once that was cleared away, the cervical os is about 0.5cm open with slow ooze of blood) in the vagina. No erythema, tenderness, rectocele, cystocele or unspecified prolapse of vaginal walls in the vagina. No foreign body in the vagina. No signs of injury around the vagina. No vaginal discharge found. Cervix exhibits no motion tenderness, no discharge and no friability.        Uterus Size: 6 cm       Neurological: She is alert and oriented to person, place, and time.     Psychiatric: She has a normal mood and affect.        Ultrasound: findings consistent with retained products of conception  Assessment:        1. Incomplete     2. Episode of heavy vaginal bleeding                Plan:      Noemi was seen today for pelvic pain.    Diagnoses and all orders for this visit:    Incomplete   -     Discontinue: oxycodone-acetaminophen (PERCOCET) 5-325 mg per tablet; Take 1 tablet by mouth every 4 (four) hours as needed for Pain.  -     Discontinue: promethazine (PHENERGAN) 25 MG tablet; Take 1 tablet (25 mg total) by mouth every 4 (four) hours as needed for Nausea.  -     Discontinue: misoprostol (CYTOTEC) 200 MCG Tab; Take 1 tablet (200 mcg total) by mouth every 6 (six) hours.  -     US OB/GYN Procedure ([facility])-Future; Future  -     oxycodone-acetaminophen (PERCOCET) 5-325 mg per  tablet; Take 1 tablet by mouth every 4 (four) hours as needed for Pain.  -     promethazine (PHENERGAN) 25 MG tablet; Take 1 tablet (25 mg total) by mouth every 4 (four) hours as needed for Nausea.  -     misoprostol (CYTOTEC) 200 MCG Tab; Take 1 tablet (200 mcg total) by mouth every 6 (six) hours.    Episode of heavy vaginal bleeding  -     US Pelvis Complete Non OB; Future  -     US OB/GYN Procedure ([facility])-Future; Future    Reviewed diagnosis of incomplete AB with the patient.  Discussed repeat dose of cytotec versus suction D&C.  Patient is not interested in surgery at this time, and just wants to try the cytotec again.  Work excuse given through the weekend.  Bleeding, pain, and fever precautions discussed.  RTC Monday for repeat ultrasound and exam.

## 2017-01-19 NOTE — PATIENT INSTRUCTIONS
Incomplete Miscarriage  Today's exams show your pregnancy has ended suddenly. We understand that this is emotionally difficult. There is little we can say to change the way you feel. But understand that miscarriages are common.  About 1 or 2 out of every 10 pregnancies end this way. Some end even before you know you are pregnant. This happens for a number of reasons, and usually we never figure out why. Its important you know that it is not your fault. It didnt happen because you did anything wrong.  Having sex or exercising does not cause a miscarriage. These activities are usually safe unless you have pain or bleeding or your doctor tells you to stop. Even minor falls wont cause a miscarriage. Miscarriages happen because things were not developing as they were supposed to. No medicine can prevent a miscarriage. After you have recovered, you should still be able to get pregnant again. But before trying, talk with your health care provider.  It appears that your miscarriage is not yet complete. Some tissue from the pregnancy is in the uterus. You will probably have more cramping and bleeding for the next few days as the tissue leaves your uterus. Usually all of the  tissue will pass out by itself. But sometimes tissue remains. In that case, it must be removed to stop bleeding and prevent infection.  Home care  Follow these tips to take of  yourself at home:  · You can go back to your normal activities if you dont have heavy bleeding or pain.  · You may have some cramping and bleeding, but it shouldnt be severe.  Until the bleeding stops completely and to prevent infection:  · Dont have sex until your health care provider says its OK.  · Use sanitary napkins instead of tampons.  · Dont douche.  Having a miscarriage can be very difficult emotionally. It is natural to feel sadness or grief. It may help to talk about your feelings with family and friends, or with a counselor.  Follow-up care  You may pass  fetal tissue. If you see anything, it may appear as a 1-inch or larger piece of gray or pink flesh. If fetal tissue has not passed from your vagina within the next 5 days, you need to see your health care provider for another exam. To prevent infection in the uterus, your provider might need to take out the tissue by surgery. Or you may be given medication to take at home to help your body expel the rest of the tissue.  Note: If you had an ultrasound, a radiologist will review it. You will be told of any new findings that may affect your care.  Emergency care  Call 911 if you have:  · Severe pain and very heavy bleeding  · Severe lightheadedness, passing out, or fainting  · Rapid heart rate  · Difficulty breathing  · Confusion or difficulty waking up  When to seek medical care  Get prompt medical attention if any of these occur:  · Heavy bleeding. This means soaking 1 new pad an hour over 3 hours.  · Foul-smelling vaginal discharge  · Fever of 100.4°F (38°C) or higher, or as told by your health care provider  · Pain in your lower belly (abdomen) that gets worse  · Weakness or dizziness  © 4980-1683 [employer]. [address] [place] [address], [place] [address]. All rights reserved. This information is not intended as a substitute for professional medical care. Always follow your healthcare professional's instructions.

## 2017-01-23 ENCOUNTER — PROCEDURE VISIT (OUTPATIENT)
Dept: OBSTETRICS AND GYNECOLOGY | Facility: CLINIC | Age: 27
End: 2017-01-23
Payer: MEDICAID

## 2017-01-23 ENCOUNTER — LAB VISIT (OUTPATIENT)
Dept: LAB | Facility: HOSPITAL | Age: 27
End: 2017-01-23
Attending: OBSTETRICS & GYNECOLOGY
Payer: MEDICAID

## 2017-01-23 ENCOUNTER — OFFICE VISIT (OUTPATIENT)
Dept: OBSTETRICS AND GYNECOLOGY | Facility: CLINIC | Age: 27
End: 2017-01-23
Payer: MEDICAID

## 2017-01-23 VITALS
HEIGHT: 66 IN | WEIGHT: 115.06 LBS | SYSTOLIC BLOOD PRESSURE: 120 MMHG | BODY MASS INDEX: 18.49 KG/M2 | DIASTOLIC BLOOD PRESSURE: 62 MMHG

## 2017-01-23 DIAGNOSIS — O03.4 INCOMPLETE ABORTION: ICD-10-CM

## 2017-01-23 DIAGNOSIS — O03.4 INCOMPLETE ABORTION: Primary | ICD-10-CM

## 2017-01-23 LAB — HCG INTACT+B SERPL-ACNC: 5.7 MIU/ML

## 2017-01-23 PROCEDURE — 36415 COLL VENOUS BLD VENIPUNCTURE: CPT

## 2017-01-23 PROCEDURE — 99213 OFFICE O/P EST LOW 20 MIN: CPT | Mod: TH,S$PBB,, | Performed by: OBSTETRICS & GYNECOLOGY

## 2017-01-23 PROCEDURE — 99999 PR PBB SHADOW E&M-EST. PATIENT-LVL III: CPT | Mod: PBBFAC,,, | Performed by: OBSTETRICS & GYNECOLOGY

## 2017-01-23 PROCEDURE — 76817 TRANSVAGINAL US OBSTETRIC: CPT | Mod: 26,S$PBB,, | Performed by: OBSTETRICS & GYNECOLOGY

## 2017-01-23 PROCEDURE — 84702 CHORIONIC GONADOTROPIN TEST: CPT

## 2017-01-23 RX ORDER — MISOPROSTOL 200 UG/1
TABLET ORAL
Qty: 8 TABLET | Refills: 0 | Status: SHIPPED | OUTPATIENT
Start: 2017-01-23 | End: 2017-01-31

## 2017-01-23 RX ORDER — OXYCODONE AND ACETAMINOPHEN 5; 325 MG/1; MG/1
1 TABLET ORAL EVERY 4 HOURS PRN
Qty: 20 TABLET | Refills: 0 | Status: SHIPPED | OUTPATIENT
Start: 2017-01-23 | End: 2017-01-31

## 2017-01-23 NOTE — MR AVS SNAPSHOT
[facility] OB/ GYN  [address] [address]  Phone: [phone]  Fax: [phone]                  Noemi Morales   2017 8:00 AM   Office Visit    Description:  Female : 1990   Provider:  Eneida Garcia MD   Department:  [facility] - OB/ GYN           Reason for Visit     Follow-up           Diagnoses this Visit        Comments    Incomplete     -  Primary            To Do List           Future Appointments        Provider Department Dept Phone    2017 9:45 AM LAB, SAME DAY O'NEAL [facility] [phone]    2017 3:00 PM ULTRASOUND, OB-GYN [facility]/ [facility] [phone]    2017 9:45 AM Eneida Garcia MD [facility]/ [facility] [phone]    2017 11:30 AM ULTRASOUND, OB-GYN [facility] OB/ -[phone]      Goals (5 Years of Data)     None       These Medications        Disp Refills Start End    misoprostol (CYTOTEC) 200 MCG Tab 8 tablet 0 2017     Place 4 tabs intravaginally q 3 days for two doses    Pharmacy: [facility] [id number] [place] [address] [address] AT [facility] Ph #: [phone]       oxycodone-acetaminophen (PERCOCET) 5-325 mg per tablet 20 tablet 0 2017     Take 1 tablet by mouth every 4 (four) hours as needed for Pain. - Oral    Pharmacy: [facility] [id number] [place] [address] [address] AT [facility] Ph #: [phone]         [facility]     [facility] -  Assistance  Registered nurses in the [facility] provide clinical advisement, health education, appointment booking, and other advisory services.  Call for this free service at [phone].             Medications           Message regarding Medications     Verify the changes and/or additions to your medication regime listed below are the same as discussed with your clinician today.  If any of these changes or additions  "are incorrect, please notify your healthcare provider.        START taking these NEW medications        Refills    misoprostol (CYTOTEC) 200 MCG Tab 0    Sig: Place 4 tabs intravaginally q 3 days for two doses    Class: Normal    oxycodone-acetaminophen (PERCOCET) 5-325 mg per tablet 0    Sig: Take 1 tablet by mouth every 4 (four) hours as needed for Pain.    Class: Normal    Route: Oral      STOP taking these medications     acetaminophen (TYLENOL) 500 MG tablet Take 2 tablets (1,000 mg total) by mouth 3 (three) times daily as needed for Pain.    diclofenac (VOLTAREN) 75 MG EC tablet Take 1 tablet (75 mg total) by mouth 2 (two) times daily.    ibuprofen (ADVIL,MOTRIN) 200 MG tablet Take 2 tablets (400 mg total) by mouth every 6 (six) hours as needed for Pain.           Verify that the below list of medications is an accurate representation of the medications you are currently taking.  If none reported, the list may be blank. If incorrect, please contact your healthcare provider. Carry this list with you in case of emergency.           Current Medications     lamotrigine (LAMICTAL) 200 MG tablet Take 1 tablet (200 mg total) by mouth 2 (two) times daily.    promethazine (PHENERGAN) 25 MG tablet Take 1 tablet (25 mg total) by mouth every 4 (four) hours as needed for Nausea.    misoprostol (CYTOTEC) 200 MCG Tab Place 4 tabs intravaginally q 3 days for two doses    oxycodone-acetaminophen (PERCOCET) 5-325 mg per tablet Take 1 tablet by mouth every 4 (four) hours as needed for Pain.           Clinical Reference Information           Vital Signs - Last Recorded  Most recent update: 1/23/2017  8:06 AM by Dianne Will MA    BP Ht Wt LMP Breastfeeding? BMI    120/62 (BP Location: Left arm, Patient Position: Sitting, BP Method: Manual) 5' 6" (1.676 m) 52.2 kg (115 lb 1.3 oz) 10/08/2016 No 18.57 kg/m2      Blood Pressure          Most Recent Value    BP  120/62      Allergies as of 1/23/2017     Sulfa (Sulfonamide " Antibiotics)      Immunizations Administered on Date of Encounter - 1/23/2017     None      Orders Placed During Today's Visit     Future Labs/Procedures Expected by Expires    hCG, quantitative  1/23/2017 3/24/2018    US OB/GYN Procedure ([facility])-Future  As directed 1/23/2018      Smoking Cessation     If you would like to quit smoking:   You may be eligible for free services if you are a [place] resident and started smoking cigarettes before September 1, 1988.  Call the Smoking Cessation Trust (SCT) toll free at (868) 265-1788 or [phone].   Call 6-310-QUIT-NOW if you do not meet the above criteria.

## 2017-01-23 NOTE — PROGRESS NOTES
Subjective:       Patient ID: Noemi Morales is a 26 y.o. female.    Chief Complaint:  Follow-up (Missed AB)      History of Present Illness  HPI  Presents to follow-up incomplete AB.  Saw me Friday with heavy vaginal bleeding and cramping.  Took cytotec for missed AB back in November.  Ultrasound on Friday showed some heterogeneous material in the uterus consistent with POC.  Patient opted for cytotec, and took cytotec 200 micrograms po q 6 hrs x 4 doses this weekend.  States her bleeding is now much lighter, and her cramping has improved.  Denies any fevers, chills, N/V, or severe pain.  No foul smelling vaginal discharge.      GYN & OB History  Patient's last menstrual period was 10/08/2016.   Date of Last Pap: 2016    OB History    Para Term  AB SAB TAB Ectopic Multiple Living   3 1 1  2 1 1   1      # Outcome Date GA Lbr Francisco/2nd Weight Sex Delivery Anes PTL Lv   3 TAB            2 Term     M CS-Unspec   Y   1 SAB                   Review of Systems  Review of Systems   Constitutional: Negative for fatigue, fever and unexpected weight change.   Gastrointestinal: Negative for abdominal pain, constipation, diarrhea, nausea and vomiting.   Genitourinary: Positive for vaginal bleeding (scant). Negative for dysuria, frequency, pelvic pain, urgency, vaginal discharge, vaginal pain, postcoital bleeding and vaginal odor.           Objective:    Physical Exam:   Constitutional: She is oriented to person, place, and time. She appears well-developed and well-nourished. No distress.             Abdominal: Soft. She exhibits no distension and no mass. There is no tenderness. There is no rebound and no guarding. Hernia confirmed negative in the right inguinal area and confirmed negative in the left inguinal area.     Genitourinary: Pelvic exam was performed with patient supine. There is no rash, tenderness, lesion or injury on the right labia. There is no rash, tenderness, lesion or injury on the  left labia. Uterus is not deviated, not enlarged, not fixed, not tender and not experiencing uterine prolapse. Right adnexum displays no mass, no tenderness and no fullness. Left adnexum displays no mass, no tenderness and no fullness. There is bleeding (scant ) in the vagina. No erythema, tenderness, rectocele, cystocele or unspecified prolapse of vaginal walls in the vagina. No foreign body in the vagina. No signs of injury around the vagina. No vaginal discharge found. Cervix exhibits no motion tenderness, no discharge and no friability.        Uterus Size: 6 cm       Neurological: She is alert and oriented to person, place, and time.     Psychiatric: She has a normal mood and affect.        Ultrasound: heterogenous material present within endometrial cavity consistent with retained POC; appears less than what was seen Friday  Assessment:        1. Incomplete                 Plan:      Noemi was seen today for follow-up.    Diagnoses and all orders for this visit:    Incomplete   -     hCG, quantitative; Future  -     misoprostol (CYTOTEC) 200 MCG Tab; Place 4 tabs intravaginally q 3 days for two doses  -     oxycodone-acetaminophen (PERCOCET) 5-325 mg per tablet; Take 1 tablet by mouth every 4 (four) hours as needed for Pain.  -     US OB/GYN Procedure (Viewpoint)-Future; Future    Reviewed ultrasound findings with the patient.  Again, offered her a suction D&C versus repeat cytotec.  Patient really does not want surgery at this time, and opts for another attempt with cytotec.  Will give 800 mcg vaginally q 3 days x 2 doses, and RTC in 1 week for exam and follow-up ultrasound.  Pain, bleeding, fever precautions discussed.

## 2017-01-27 ENCOUNTER — TELEPHONE (OUTPATIENT)
Dept: OBSTETRICS AND GYNECOLOGY | Facility: CLINIC | Age: 27
End: 2017-01-27

## 2017-01-27 NOTE — TELEPHONE ENCOUNTER
----- Message from Laura Reid sent at 1/27/2017  8:26 AM CST -----  Contact: pt  Please call pt @ [phone]    Patient needs a refill on pain medication and nausea medication called [facility] pharmacy at [facility].  Please call patient at [phone], if you have any questions. Thanks!

## 2017-01-31 ENCOUNTER — OFFICE VISIT (OUTPATIENT)
Dept: OBSTETRICS AND GYNECOLOGY | Facility: CLINIC | Age: 27
End: 2017-01-31
Payer: MEDICAID

## 2017-01-31 ENCOUNTER — LAB VISIT (OUTPATIENT)
Dept: LAB | Facility: HOSPITAL | Age: 27
End: 2017-01-31
Attending: OBSTETRICS & GYNECOLOGY
Payer: MEDICAID

## 2017-01-31 ENCOUNTER — PROCEDURE VISIT (OUTPATIENT)
Dept: OBSTETRICS AND GYNECOLOGY | Facility: CLINIC | Age: 27
End: 2017-01-31
Payer: MEDICAID

## 2017-01-31 VITALS
WEIGHT: 112.63 LBS | DIASTOLIC BLOOD PRESSURE: 70 MMHG | BODY MASS INDEX: 18.1 KG/M2 | HEIGHT: 66 IN | SYSTOLIC BLOOD PRESSURE: 110 MMHG

## 2017-01-31 DIAGNOSIS — O03.4 INCOMPLETE ABORTION: Primary | ICD-10-CM

## 2017-01-31 DIAGNOSIS — O03.4 INCOMPLETE ABORTION: ICD-10-CM

## 2017-01-31 LAB
ABO + RH BLD: NORMAL
ANION GAP SERPL CALC-SCNC: 10 MMOL/L
BASOPHILS # BLD AUTO: 0.02 K/UL
BASOPHILS NFR BLD: 0.3 %
BLD GP AB SCN CELLS X3 SERPL QL: NORMAL
BUN SERPL-MCNC: 6 MG/DL
CALCIUM SERPL-MCNC: 8.8 MG/DL
CHLORIDE SERPL-SCNC: 108 MMOL/L
CO2 SERPL-SCNC: 21 MMOL/L
CREAT SERPL-MCNC: 0.7 MG/DL
DIFFERENTIAL METHOD: ABNORMAL
EOSINOPHIL # BLD AUTO: 0.1 K/UL
EOSINOPHIL NFR BLD: 1.1 %
ERYTHROCYTE [DISTWIDTH] IN BLOOD BY AUTOMATED COUNT: 12.8 %
EST. GFR  (AFRICAN AMERICAN): >60 ML/MIN/1.73 M^2
EST. GFR  (NON AFRICAN AMERICAN): >60 ML/MIN/1.73 M^2
GLUCOSE SERPL-MCNC: 84 MG/DL
HCT VFR BLD AUTO: 34.5 %
HGB BLD-MCNC: 11.3 G/DL
LYMPHOCYTES # BLD AUTO: 1.5 K/UL
LYMPHOCYTES NFR BLD: 20.8 %
MCH RBC QN AUTO: 30.5 PG
MCHC RBC AUTO-ENTMCNC: 32.8 %
MCV RBC AUTO: 93 FL
MONOCYTES # BLD AUTO: 0.5 K/UL
MONOCYTES NFR BLD: 6.5 %
NEUTROPHILS # BLD AUTO: 5 K/UL
NEUTROPHILS NFR BLD: 71.2 %
PLATELET # BLD AUTO: 288 K/UL
PMV BLD AUTO: 12 FL
POTASSIUM SERPL-SCNC: 4.1 MMOL/L
RBC # BLD AUTO: 3.71 M/UL
SODIUM SERPL-SCNC: 139 MMOL/L
WBC # BLD AUTO: 7.07 K/UL

## 2017-01-31 PROCEDURE — 80048 BASIC METABOLIC PNL TOTAL CA: CPT

## 2017-01-31 PROCEDURE — 99999 PR PBB SHADOW E&M-EST. PATIENT-LVL III: CPT | Mod: PBBFAC,,, | Performed by: OBSTETRICS & GYNECOLOGY

## 2017-01-31 PROCEDURE — 86850 RBC ANTIBODY SCREEN: CPT

## 2017-01-31 PROCEDURE — 86900 BLOOD TYPING SEROLOGIC ABO: CPT

## 2017-01-31 PROCEDURE — 76857 US EXAM PELVIC LIMITED: CPT | Mod: 26,S$PBB,, | Performed by: OBSTETRICS & GYNECOLOGY

## 2017-01-31 PROCEDURE — 85025 COMPLETE CBC W/AUTO DIFF WBC: CPT

## 2017-01-31 PROCEDURE — 99213 OFFICE O/P EST LOW 20 MIN: CPT | Mod: TH,S$PBB,, | Performed by: OBSTETRICS & GYNECOLOGY

## 2017-01-31 RX ORDER — SODIUM CHLORIDE, SODIUM LACTATE, POTASSIUM CHLORIDE, CALCIUM CHLORIDE 600; 310; 30; 20 MG/100ML; MG/100ML; MG/100ML; MG/100ML
INJECTION, SOLUTION INTRAVENOUS CONTINUOUS
Status: CANCELLED | OUTPATIENT
Start: 2017-01-31

## 2017-01-31 RX ORDER — ACETAMINOPHEN 10 MG/ML
1000 INJECTION, SOLUTION INTRAVENOUS
Status: CANCELLED | OUTPATIENT
Start: 2017-01-31 | End: 2017-01-31

## 2017-01-31 NOTE — H&P
History & Physical    SUBJECTIVE:     History of Present Illness:  Patient is a 26 y.o. female presents for pre-op visit for suction D&C for incomplete .  Patient has now completed three rounds of cytotec for a miscarriage since November.  Last week, she still had evidence of retained POC on ultrasound.  She was offered a suction D&C, but wanted to try cytotec again.  She has since completed that course of cytotec, and still has a lot of cramping, but no heavy bleeding.  No fevers, nausea, or vomiting.      Past Medical History   Diagnosis Date    Bipolar 2 disorder     Scoliosis      Past Surgical History   Procedure Laterality Date    Dilation and curettage of uterus       section  10/20/2015     x 1     Social History     Social History    Marital status: Legally      Spouse name: N/A    Number of children: N/A    Years of education: N/A     Occupational History    Coding       Social History Main Topics    Smoking status: Current Every Day Smoker     Packs/day: 0.25    Smokeless tobacco: Never Used    Alcohol use 0.0 oz/week     0 Standard drinks or equivalent per week      Comment: socially    Drug use: No    Sexual activity: Not Currently     Partners: Male     Other Topics Concern    Not on file     Social History Narrative     Family History   Problem Relation Age of Onset    No Known Problems Mother     Breast cancer Neg Hx     Colon cancer Neg Hx     Ovarian cancer Neg Hx      Review of patient's allergies indicates:   Allergen Reactions    Sulfa (sulfonamide antibiotics) Rash     No current outpatient prescriptions on file.     No current facility-administered medications for this visit.             Review of Systems:  Constitutional: no fever or chills  Respiratory: no cough or shortness of breath  Cardiovascular: no chest pain or palpitations  Gastrointestinal: no nausea or vomiting, tolerating diet  Genitourinary: see HPI  Hematologic/Lymphatic: no easy  bruising or lymphadenopathy  Neurological: no seizures or tremors      OBJECTIVE:     Vitals:    17 1047   BP: 110/70         Physical Exam:  General: well developed, well nourished, no distress  Head: normocephalic  Neck: supple, symmetrical, trachea midline  Lungs:  clear to auscultation bilaterally and normal respiratory effort  Chest Wall: no tenderness  Heart: regular rate and rhythm, S1, S2 normal, no murmur, rub or gallop  Abdomen: soft, non-tender non-distented; bowel sounds normal; no masses,  no organomegaly  Extremities: no cyanosis or edema, or clubbing  Pulses: 2+ and symmetric      Laboratory  Beta hcg 26 on 17, 5.7 on 17    Diagnostic Results:  Pelvic ultrasound today with tissue within the endometrial cavity consistent with retained products of conception    ASSESSMENT/PLAN:     Noemi was seen today for follow-up.    Diagnoses and all orders for this visit:    Incomplete   -     Case Request Operating Room: D & C (SUCTION)  -     CBC auto differential; Future  -     Basic metabolic panel; Future  -     Type & Screen; Future     The risks, benefits, and alternatives of the procedure were reviewed with the patient.  Surgical consents were reviewed in detail and were signed.  All questions were answered. Proceed with surgery as scheduled.

## 2017-01-31 NOTE — PROGRESS NOTES
Subjective:       Patient ID: Noemi Morales is a 26 y.o. female.    Chief Complaint:  Follow-up (pt c/o pain on left side)      History of Present Illness  HPI  Presents to follow-up incomplete AB.  Patient has now completed three rounds of cytotec for a miscarriage since November.  Last week, she still had evidence of retained POC on ultrasound.  She was offered a suction D&C, but wanted to try cytotec again.  She has since completed that course of cytotec, and still has a lot of cramping, but no heavy bleeding.  No fevers, nausea, or vomiting.       GYN & OB History  Patient's last menstrual period was 10/08/2016.   Date of Last Pap: 2016    OB History    Para Term  AB SAB TAB Ectopic Multiple Living   3 1 1  2 1 1   1      # Outcome Date GA Lbr Francisco/2nd Weight Sex Delivery Anes PTL Lv   3 TAB            2 Term     M CS-Unspec   Y   1 SAB                   Review of Systems  Review of Systems   Constitutional: Negative for activity change, fatigue, fever and unexpected weight change.   Gastrointestinal: Negative for abdominal pain, bloating, constipation, diarrhea, nausea and vomiting.   Genitourinary: Positive for pelvic pain (cramping) and vaginal bleeding (scant; no heavy bleeding). Negative for dyspareunia, dysuria, frequency, genital sores, hematuria, menorrhagia, menstrual problem, urgency, vaginal discharge, vaginal pain, dysmenorrhea, postcoital bleeding and vaginal odor.   Skin:  Negative for rash and hair changes.           Objective:    Physical Exam:   Constitutional: She is oriented to person, place, and time. She appears well-developed and well-nourished. No distress.      Neck: Neck supple. No thyromegaly present.    Cardiovascular: Normal rate, regular rhythm and normal heart sounds.  Exam reveals no clubbing, no cyanosis and no edema.     Pulmonary/Chest: Effort normal. No respiratory distress. She has no wheezes. She has no rales.        Abdominal: Soft. She exhibits  no distension and no mass. There is no tenderness. There is no rebound and no guarding.     Genitourinary: Pelvic exam was performed with patient supine. There is no rash, tenderness, lesion or injury on the right labia. There is no rash, tenderness, lesion or injury on the left labia. Uterus is not deviated, not enlarged, not fixed, not tender, not hosting fibroids and not experiencing uterine prolapse. Cervix is normal. Right adnexum displays no mass, no tenderness and no fullness. Left adnexum displays no mass, no tenderness and no fullness. No erythema, tenderness, bleeding, rectocele or cystocele in the vagina. No foreign body in the vagina. No signs of injury around the vagina. No vaginal discharge found. Cervix exhibits no motion tenderness, no discharge and no friability.        Uterus Size: 6 cm       Neurological: She is alert and oriented to person, place, and time.    Skin: No cyanosis. Nails show no clubbing.    Psychiatric: She has a normal mood and affect.        Ultrasound today:   retained products of conception still suspected within the endometrial cavity; normal ovaries bilaterally    Beta hcg 26 on 17  Beta hcg 5.7 on 17  Assessment:        1. Incomplete                 Plan:      Noemi was seen today for follow-up.    Diagnoses and all orders for this visit:    Incomplete   -     Case Request Operating Room: D & C (SUCTION)  -     CBC auto differential; Future  -     Basic metabolic panel; Future  -     Type & Screen; Future    Based upon her ultrasound findings after 3 courses of cytotec, I recommend a suction D&C.  The patient agrees with this plan.  Added her on for this Thursday at noon.  OR aware.    The risks, benefits, and alternatives of the procedure were reviewed with the patient.  Surgical consents were reviewed in detail and were signed.  All questions were answered. Proceed with surgery as scheduled.

## 2017-01-31 NOTE — MR AVS SNAPSHOT
[facility] - OB/ GYN  [address]  [place] [address]  Phone: [phone]  Fax: [phone]                  Noemi Morales   2017 9:45 AM   Office Visit    Description:  Female : 1990   Provider:  Eneida Garcia MD   Department:  [facility] - OB/ GYN           Reason for Visit     Follow-up           Diagnoses this Visit        Comments    Incomplete     -  Primary            To Do List           Future Appointments        Provider Department Dept Phone    2017 2:00 PM LAB, SAME DAY O'NEAL [facility] [phone]    2017 10:15 AM Eneida Garcia MD [facility] OB/ -[phone]      Goals (5 Years of Data)     None      [facility]     [facility] -  Assistance  Registered nurses in the [facility] provide clinical advisement, health education, appointment booking, and other advisory services.  Call for this free service at [phone].             Medications           Message regarding Medications     Verify the changes and/or additions to your medication regime listed below are the same as discussed with your clinician today.  If any of these changes or additions are incorrect, please notify your healthcare provider.        STOP taking these medications     lamotrigine (LAMICTAL) 200 MG tablet Take 1 tablet (200 mg total) by mouth 2 (two) times daily.    misoprostol (CYTOTEC) 200 MCG Tab Place 4 tabs intravaginally q 3 days for two doses    oxycodone-acetaminophen (PERCOCET) 5-325 mg per tablet Take 1 tablet by mouth every 4 (four) hours as needed for Pain.    promethazine (PHENERGAN) 25 MG tablet Take 1 tablet (25 mg total) by mouth every 4 (four) hours as needed for Nausea.           Verify that the below list of medications is an accurate representation of the medications you are currently taking.  If none reported, the list may be blank. If incorrect, please contact your healthcare  "provider. Carry this list with you in case of emergency.           Current Medications            Clinical Reference Information           Vital Signs - Last Recorded  Most recent update: 1/31/2017 10:50 AM by Laith Tinoco MA    BP Ht Wt LMP BMI    110/70 5' 6" (1.676 m) 51.1 kg (112 lb 10.5 oz) 10/08/2016 18.18 kg/m2      Blood Pressure          Most Recent Value    BP  110/70      Allergies as of 1/31/2017     Sulfa (Sulfonamide Antibiotics)      Immunizations Administered on Date of Encounter - 1/31/2017     None      Orders Placed During Today's Visit      Normal Orders This Visit    Case Request Operating Room: D & C (SUCTION)     Future Labs/Procedures Expected by Expires    Basic metabolic panel  1/31/2017 4/1/2018    CBC auto differential  1/31/2017 4/1/2018    Type & Screen  1/31/2017 4/1/2018      Smoking Cessation     If you would like to quit smoking:   You may be eligible for free services if you are a [place] resident and started smoking cigarettes before September 1, 1988.  Call the Smoking Cessation Trust (SCT) toll free at [phone] or [phone].   Call 0-228-QUIT-NOW if you do not meet the above criteria.            "

## 2017-02-01 RX ORDER — LAMOTRIGINE 200 MG/1
200 TABLET ORAL 2 TIMES DAILY
COMMUNITY
End: 2017-02-21

## 2017-02-01 NOTE — PRE ADMISSION SCREENING
Pre op instructions reviewed with patient per phone:    To confirm, Your surgeon has instructed you:  Surgery is scheduled 02/02/17 at 1200.      Please report to [facility] 1st floor main lobby by 1030  Pre admit office to call later today is arrival time changes.      INSTRUCTIONS IMPORTANT!!!  ¨ Do not eat, drink, or smoke after 12 midnight, may have clear liquids and apple juice until 3h prior to surgery OK to brush teeth, no gum, candy or mints!    ¨ Take only these medicines with a small swallow of water-morning of surgery.  Lamictal         ____  Do not wear makeup, including mascara.  ____  No powder, lotions or creams to surgical area.  ____  Please remove all jewelry, including piercings and leave at home.  ____  No money or valuables needed. Please leave at home.  ____  Please bring identification and insurance information to hospital.  ____  If going home the same day, arrange for a ride home. You will not be able to   drive if Anesthesia was used.  ____  Children, under 12 years old, must remain in the waiting room with an adult.  They are not allowed in patient areas.  ____  Wear loose fitting clothing. Allow for dressings, bandages.  ____  Stop Aspirin, Ibuprofen, Motrin and Aleve at least 5-7 days before surgery, unless otherwise instructed by your doctor, or the nurse.   You MAY use Tylenol/acetaminophen until day of surgery.  ____  If you take diabetic medication, do not take am of surgery unless instructed by   Doctor.  ____ Stop taking any Fish Oil supplement or any Vitamins that contain Vitamin E at least 5 days prior to surgery.          Bathing Instructions-- The night before surgery and the morning prior to coming to the hospital:   -Do not shave the surgical area.   -Shower and wash your hair and body as usual with your regular soap and shampoo.   -Rinse your hair and body completely.   -Use one packet of hibiclens to wash the surgical site (using your hand) gently  for 5 minutes.  Do not scrub you skin too hard.   -Do not use hibiclens on your head, face, or genitals.   -Do not wash with regular soap after you use the hibiclens.   -Rinse your body thoroughly.   -Dry with clean, soft towel.  Do not use lotion, cream, deodorant, or powders on   the surgical site.    Use antibacterial soap in place of hibiclens if your surgery is on the head, face or genitals.         Surgical Site Infection    Prevention of surgical site infections:     -Keep incisions clean and dry.   -Do not soak/submerge incisions in water until completely healed.   -Do not apply lotions, powders, creams, or deodorants to site.   -Always make sure hands are cleaned with antibacterial soap/ alcohol-based   prior to touching the surgical site.  (This includes doctors, nurses, staff, and yourself.)    Signs and symptoms:   -Redness and pain around the area where you had surgery   -Drainage of cloudy fluid from your surgical wound   -Fever over 100.4  I have read or had read and explained to me, and understand the above information.

## 2017-02-02 ENCOUNTER — HOSPITAL ENCOUNTER (OUTPATIENT)
Facility: HOSPITAL | Age: 27
Discharge: HOME | End: 2017-02-02
Attending: OBSTETRICS & GYNECOLOGY | Admitting: OBSTETRICS & GYNECOLOGY
Payer: MEDICAID

## 2017-02-02 ENCOUNTER — ANESTHESIA (OUTPATIENT)
Dept: SURGERY | Facility: HOSPITAL | Age: 27
End: 2017-02-02
Payer: MEDICAID

## 2017-02-02 ENCOUNTER — ANESTHESIA EVENT (OUTPATIENT)
Dept: SURGERY | Facility: HOSPITAL | Age: 27
End: 2017-02-02
Payer: MEDICAID

## 2017-02-02 ENCOUNTER — SURGERY (OUTPATIENT)
Age: 27
End: 2017-02-02

## 2017-02-02 VITALS
HEART RATE: 69 BPM | DIASTOLIC BLOOD PRESSURE: 59 MMHG | RESPIRATION RATE: 17 BRPM | OXYGEN SATURATION: 100 % | BODY MASS INDEX: 17.65 KG/M2 | SYSTOLIC BLOOD PRESSURE: 112 MMHG | HEIGHT: 66 IN | WEIGHT: 109.81 LBS | TEMPERATURE: 98 F

## 2017-02-02 DIAGNOSIS — O03.4 INCOMPLETE ABORTION: ICD-10-CM

## 2017-02-02 PROCEDURE — 37000009 HC ANESTHESIA EA ADD 15 MINS: Performed by: OBSTETRICS & GYNECOLOGY

## 2017-02-02 PROCEDURE — 63600175 PHARM REV CODE 636 W HCPCS: Performed by: ANESTHESIOLOGY

## 2017-02-02 PROCEDURE — 59812 TREATMENT OF MISCARRIAGE: CPT | Mod: ,,, | Performed by: OBSTETRICS & GYNECOLOGY

## 2017-02-02 PROCEDURE — 88305 TISSUE EXAM BY PATHOLOGIST: CPT | Performed by: PATHOLOGY

## 2017-02-02 PROCEDURE — 88305 TISSUE EXAM BY PATHOLOGIST: CPT | Mod: 26,,, | Performed by: PATHOLOGY

## 2017-02-02 PROCEDURE — 25000003 PHARM REV CODE 250: Performed by: NURSE ANESTHETIST, CERTIFIED REGISTERED

## 2017-02-02 PROCEDURE — 71000033 HC RECOVERY, INTIAL HOUR: Performed by: OBSTETRICS & GYNECOLOGY

## 2017-02-02 PROCEDURE — 71000015 HC POSTOP RECOV 1ST HR: Performed by: OBSTETRICS & GYNECOLOGY

## 2017-02-02 PROCEDURE — 36000705 HC OR TIME LEV I EA ADD 15 MIN: Performed by: OBSTETRICS & GYNECOLOGY

## 2017-02-02 PROCEDURE — 25000003 PHARM REV CODE 250: Performed by: ANESTHESIOLOGY

## 2017-02-02 PROCEDURE — 37000008 HC ANESTHESIA 1ST 15 MINUTES: Performed by: OBSTETRICS & GYNECOLOGY

## 2017-02-02 PROCEDURE — 36000704 HC OR TIME LEV I 1ST 15 MIN: Performed by: OBSTETRICS & GYNECOLOGY

## 2017-02-02 PROCEDURE — 63600175 PHARM REV CODE 636 W HCPCS: Performed by: NURSE ANESTHETIST, CERTIFIED REGISTERED

## 2017-02-02 PROCEDURE — 25000003 PHARM REV CODE 250: Performed by: OBSTETRICS & GYNECOLOGY

## 2017-02-02 PROCEDURE — 63600175 PHARM REV CODE 636 W HCPCS: Performed by: OBSTETRICS & GYNECOLOGY

## 2017-02-02 RX ORDER — MORPHINE SULFATE 10 MG/ML
2 INJECTION INTRAMUSCULAR; INTRAVENOUS; SUBCUTANEOUS EVERY 5 MIN PRN
Status: COMPLETED | OUTPATIENT
Start: 2017-02-02 | End: 2017-02-02

## 2017-02-02 RX ORDER — SODIUM CHLORIDE 9 MG/ML
3 INJECTION, SOLUTION INTRAMUSCULAR; INTRAVENOUS; SUBCUTANEOUS EVERY 8 HOURS
Status: DISCONTINUED | OUTPATIENT
Start: 2017-02-02 | End: 2017-02-02 | Stop reason: HOSPADM

## 2017-02-02 RX ORDER — PROPOFOL 10 MG/ML
VIAL (ML) INTRAVENOUS
Status: DISCONTINUED | OUTPATIENT
Start: 2017-02-02 | End: 2017-02-02

## 2017-02-02 RX ORDER — FENTANYL CITRATE 50 UG/ML
INJECTION, SOLUTION INTRAMUSCULAR; INTRAVENOUS
Status: DISCONTINUED | OUTPATIENT
Start: 2017-02-02 | End: 2017-02-02

## 2017-02-02 RX ORDER — SODIUM CHLORIDE 9 MG/ML
3 INJECTION, SOLUTION INTRAMUSCULAR; INTRAVENOUS; SUBCUTANEOUS
Status: DISCONTINUED | OUTPATIENT
Start: 2017-02-02 | End: 2017-02-02 | Stop reason: HOSPADM

## 2017-02-02 RX ORDER — MIDAZOLAM HYDROCHLORIDE 1 MG/ML
INJECTION, SOLUTION INTRAMUSCULAR; INTRAVENOUS
Status: DISCONTINUED | OUTPATIENT
Start: 2017-02-02 | End: 2017-02-02

## 2017-02-02 RX ORDER — ACETAMINOPHEN 10 MG/ML
1000 INJECTION, SOLUTION INTRAVENOUS
Status: COMPLETED | OUTPATIENT
Start: 2017-02-02 | End: 2017-02-02

## 2017-02-02 RX ORDER — MEPERIDINE HYDROCHLORIDE 50 MG/ML
12.5 INJECTION INTRAMUSCULAR; INTRAVENOUS; SUBCUTANEOUS ONCE AS NEEDED
Status: COMPLETED | OUTPATIENT
Start: 2017-02-02 | End: 2017-02-02

## 2017-02-02 RX ORDER — LIDOCAINE HCL/PF 100 MG/5ML
SYRINGE (ML) INTRAVENOUS
Status: DISCONTINUED | OUTPATIENT
Start: 2017-02-02 | End: 2017-02-02

## 2017-02-02 RX ORDER — ONDANSETRON 2 MG/ML
INJECTION INTRAMUSCULAR; INTRAVENOUS
Status: DISCONTINUED | OUTPATIENT
Start: 2017-02-02 | End: 2017-02-02

## 2017-02-02 RX ORDER — OXYCODONE AND ACETAMINOPHEN 5; 325 MG/1; MG/1
1 TABLET ORAL EVERY 4 HOURS PRN
Qty: 15 TABLET | Refills: 0 | Status: SHIPPED | OUTPATIENT
Start: 2017-02-02 | End: 2017-02-21

## 2017-02-02 RX ORDER — SODIUM CHLORIDE, SODIUM LACTATE, POTASSIUM CHLORIDE, CALCIUM CHLORIDE 600; 310; 30; 20 MG/100ML; MG/100ML; MG/100ML; MG/100ML
INJECTION, SOLUTION INTRAVENOUS CONTINUOUS
Status: DISCONTINUED | OUTPATIENT
Start: 2017-02-02 | End: 2017-02-02 | Stop reason: HOSPADM

## 2017-02-02 RX ORDER — DOXYCYCLINE 100 MG/1
100 CAPSULE ORAL 2 TIMES DAILY
Qty: 14 CAPSULE | Refills: 0 | Status: SHIPPED | OUTPATIENT
Start: 2017-02-02 | End: 2017-02-09

## 2017-02-02 RX ORDER — DEXAMETHASONE SODIUM PHOSPHATE 4 MG/ML
INJECTION, SOLUTION INTRA-ARTICULAR; INTRALESIONAL; INTRAMUSCULAR; INTRAVENOUS; SOFT TISSUE
Status: DISCONTINUED | OUTPATIENT
Start: 2017-02-02 | End: 2017-02-02

## 2017-02-02 RX ADMIN — SODIUM CHLORIDE, SODIUM LACTATE, POTASSIUM CHLORIDE, AND CALCIUM CHLORIDE: 600; 310; 30; 20 INJECTION, SOLUTION INTRAVENOUS at 11:02

## 2017-02-02 RX ADMIN — DOXYCYCLINE 100 MG: 100 INJECTION, POWDER, LYOPHILIZED, FOR SOLUTION INTRAVENOUS at 12:02

## 2017-02-02 RX ADMIN — MORPHINE SULFATE 2 MG: 10 INJECTION, SOLUTION INTRAMUSCULAR; INTRAVENOUS at 01:02

## 2017-02-02 RX ADMIN — MORPHINE SULFATE 2 MG: 10 INJECTION, SOLUTION INTRAMUSCULAR; INTRAVENOUS at 12:02

## 2017-02-02 RX ADMIN — ONDANSETRON 4 MG: 2 INJECTION, SOLUTION INTRAMUSCULAR; INTRAVENOUS at 12:02

## 2017-02-02 RX ADMIN — ACETAMINOPHEN 1000 MG: 10 INJECTION, SOLUTION INTRAVENOUS at 10:02

## 2017-02-02 RX ADMIN — MEPERIDINE HYDROCHLORIDE 12.5 MG: 50 INJECTION INTRAMUSCULAR; INTRAVENOUS; SUBCUTANEOUS at 01:02

## 2017-02-02 RX ADMIN — PROPOFOL 200 MG: 10 INJECTION, EMULSION INTRAVENOUS at 12:02

## 2017-02-02 RX ADMIN — LIDOCAINE HYDROCHLORIDE 40 MG: 20 INJECTION, SOLUTION INTRAVENOUS at 12:02

## 2017-02-02 RX ADMIN — FENTANYL CITRATE 50 MCG: 50 INJECTION, SOLUTION INTRAMUSCULAR; INTRAVENOUS at 12:02

## 2017-02-02 RX ADMIN — MIDAZOLAM HYDROCHLORIDE 2 MG: 1 INJECTION, SOLUTION INTRAMUSCULAR; INTRAVENOUS at 11:02

## 2017-02-02 RX ADMIN — DEXAMETHASONE SODIUM PHOSPHATE 4 MG: 4 INJECTION, SOLUTION INTRA-ARTICULAR; INTRALESIONAL; INTRAMUSCULAR; INTRAVENOUS; SOFT TISSUE at 12:02

## 2017-02-02 NOTE — IP AVS SNAPSHOT
[address] [place] [address]           Patient Discharge Instructions     Our goal is to set you up for success. This packet includes information on your condition, medications, and your home care. It will help you to care for yourself so you don't get sicker and need to go back to the hospital.     Please ask your nurse if you have any questions.        There are many details to remember when preparing to leave the hospital. Here is what you will need to do:    1. Take your medicine. If you are prescribed medications, review your Medication List in the following pages. You may have new medications to  at the pharmacy and others that you'll need to stop taking. Review the instructions for how and when to take your medications. Talk with your doctor or nurses if you are unsure of what to do.     2. Go to your follow-up appointments. Specific follow-up information is listed in the following pages. Your may be contacted by a transition nurse or clinical provider about future appointments. Be sure we have all of the phone numbers to reach you, if needed. Please contact your provider's office if you are unable to make an appointment.     3. Watch for warning signs. Your doctor or nurse will give you detailed warning signs to watch for and when to call for assistance. These instructions may also include educational information about your condition. If you experience any of warning signs to your health, call your doctor.               ** Verify the list of medication(s) below is accurate and up to date. Carry this with you in case of emergency. If your medications have changed, please notify your healthcare provider.             Medication List      START taking these medications        Additional Info                      doxycycline 100 MG capsule   Commonly known as:  MONODOX   Quantity:  14 capsule   Refills:  0   Dose:  100 mg    Instructions:  Take 1 capsule  (100 mg total) by mouth 2 (two) times daily.     Begin Date    AM    Noon    PM    Bedtime       oxycodone-acetaminophen 5-325 mg per tablet   Commonly known as:  PERCOCET   Quantity:  15 tablet   Refills:  0   Dose:  1 tablet    Instructions:  Take 1 tablet by mouth every 4 (four) hours as needed for Pain.     Begin Date    AM    Noon    PM    Bedtime         CONTINUE taking these medications        Additional Info                      lamotrigine 200 MG tablet   Commonly known as:  LAMICTAL   Refills:  0   Dose:  200 mg    Instructions:  Take 200 mg by mouth 2 (two) times daily.     Begin Date    AM    Noon    PM    Bedtime            Where to Get Your Medications      These medications were sent to [id number]  [place] - [address] [facility]  [address] [address]     Phone:  [phone]     doxycycline 100 MG capsule    oxycodone-acetaminophen 5-325 mg per tablet                  Please bring to all follow up appointments:    1. A copy of your discharge instructions.  2. All medicines you are currently taking in their original bottles.  3. Identification and insurance card.    Please arrive 15 minutes ahead of scheduled appointment time.    Please call 24 hours in advance if you must reschedule your appointment and/or time.        Your Scheduled Appointments     Feb 23, 2017 10:15 AM CST   Established Patient Visit with MD Pearl Orozco - OB/ GYN (Pearl)    [address] [facility] [address] [phone]              Follow-up Information     Follow up with Eneida Garcia MD On 2/23/2017.    Specialties:  Obstetrics and Gynecology, Obstetrics    Why:  post-op check    Contact information:    [address]  [facility] [address] [phone]          Discharge Instructions     Future Orders    Activity as tolerated     Call MD for:  difficulty breathing or increased cough     Call MD for:  increased  confusion or weakness     Call MD for:  persistent dizziness, light-headedness, or visual disturbances     Call MD for:  persistent nausea and vomiting or diarrhea     Call MD for:  redness, tenderness, or signs of infection (pain, swelling, redness, odor or green/yellow discharge around incision site)     Call MD for:  severe persistent headache     Call MD for:  severe uncontrolled pain     Call MD for:  temperature >100.4     Call MD for:  worsening rash     Diet general     Questions:    Total calories:      Fat restriction, if any:      Protein restriction, if any:      Na restriction, if any:      Fluid restriction:      Additional restrictions:      Lifting restrictions     Comments:    No lifting >20 pounds    No dressing needed         Discharge Instructions         Discharge Instructions for Dilation and Curettage (D and C)  Your doctor performed dilation and curettage (D&C). The reasons for having this procedure vary from person to person. The D&C may be done to control heavy uterine bleeding, to find the cause of irregular bleeding, to perform an , or to remove pregnancy tissue if you have had a miscarriage.  Home care  · Take it easy. Rest for 2 days as needed.  · Return to your normal activities after 24 to 48 hours. You may also return to work at that time.  · Eat a normal diet.  · Take an over-the-counter pain reliever for pain, if needed.  · Remember, its OK to have bleeding for about a week after the procedure. The amount of bleeding should be similar to what you have during a normal period.  · Dont drive for 24 hours after the procedure unless specifically told by your provider that it is OK to do so.  · Dont have sexual intercourse or use tampons or douches until your doctor says its safe to do so.  Follow-up  · Make a follow-up appointment as directed by our staff.     When to call your doctor  Call your doctor right away if you have any of the following:  · Bleeding that soaks more  than one sanitary pad in one hour  · Severe abdominal pain  · Severe cramps  · Fever above 100.4°F (38.0°C)  · A foul smelling vaginal discharge   © 8355-1155 [employer]. [address] [place] [address], [place] [address]. All rights reserved. This information is not intended as a substitute for professional medical care. Always follow your healthcare professional's instructions.    General Information:    1. Do not drink alcoholic beverages including beer for 24 hours or as long as you are on pain medication..  2. Do not drive a motor vehicle, operate machinery or power tools, or signs legal papers for 24 hours or as long as you are on pain medication.   3. You may experience light-headedness, dizziness, and sleepiness following surgery. Please do not stay alone. A responsible adult should be with you for this 24 hour period.  4. Go home and rest.  5. Progress slowly to a normal diet unless instructed.  Otherwise, begin with liquids such as soft drinks, then soup and crackers working up to solid foods. Drink plenty of nonalcoholic fluids.  6. Certain anesthetics and pain medications produce nausea and vomiting in certain individuals. If nausea becomes a problem at home, call you doctor.  7. A nurse will be calling you sometime after surgery. Do not be alarmed. This is our way of finding out how you are doing.  8. Several times every hour while you are awake, take 2-3 deep breaths and cough. If you had stomach surgery hold a pillow or rolled towel firmly against your stomach before you cough. This will help with any pain the cough might cause.  9. Several times every hour while you are awake, pump and flex your feet 5-6 times and do foot circles. This will help prevent blood clots.  10. Call your doctor for severe pain, bleeding, fever, or signs or symptoms of infection (pain, swelling, redness, foul odor, drainage).  11. You can contact your doctor anytime by [phone] for the [facility] (at  [email or web address] or [phone] for the [facility] on [facility].   [email or web address] is another way to contact your doctor if you are an active participant online with [facility].    Doxycycline Hyclate Oral capsule  What is this medicine?  DOXYCYCLINE (dox jam cook) is a tetracycline antibiotic. It kills certain bacteria or stops their growth. It is used to treat many kinds of infections, like dental, skin, respiratory, and urinary tract infections. It also treats acne, Lyme disease, malaria, and certain sexually transmitted infections.  This medicine may be used for other purposes; ask your health care provider or pharmacist if you have questions.  What should I tell my health care provider before I take this medicine?  They need to know if you have any of these conditions:  · liver disease  · long exposure to sunlight like working outdoors  · stomach problems like colitis  · an unusual or allergic reaction to doxycycline, tetracycline antibiotics, other medicines, foods, dyes, or preservatives  · pregnant or trying to get pregnant  · breast-feeding  How should I use this medicine?  Take this medicine by mouth with a full glass of water. Follow the directions on the prescription label. It is best to take this medicine without food, but if it upsets your stomach take it with food. Take your medicine at regular intervals. Do not take your medicine more often than directed. Take all of your medicine as directed even if you think you are better. Do not skip doses or stop your medicine early.  Talk to your pediatrician regarding the use of this medicine in children. While this drug may be prescribed for selected conditions, precautions do apply.  Overdosage: If you think you have taken too much of this medicine contact a poison control center or emergency room at once.  NOTE: This medicine is only for you. Do not share this medicine with others.  What if I miss a dose?  If you miss a dose, take it as soon as  you can. If it is almost time for your next dose, take only that dose. Do not take double or extra doses.  What may interact with this medicine?  · antacids  · barbiturates  · birth control pills  · bismuth subsalicylate  · carbamazepine  · methoxyflurane  · other antibiotics  · phenytoin  · vitamins that contain iron  · warfarin  This list may not describe all possible interactions. Give your health care provider a list of all the medicines, herbs, non-prescription drugs, or dietary supplements you use. Also tell them if you smoke, drink alcohol, or use illegal drugs. Some items may interact with your medicine.  What should I watch for while using this medicine?  Tell your doctor or health care professional if your symptoms do not improve.  Do not treat diarrhea with over the counter products. Contact your doctor if you have diarrhea that lasts more than 2 days or if it is severe and watery.  Do not take this medicine just before going to bed. It may not dissolve properly when you lay down and can cause pain in your throat. Drink plenty of fluids while taking this medicine to also help reduce irritation in your throat.  This medicine can make you more sensitive to the sun. Keep out of the sun. If you cannot avoid being in the sun, wear protective clothing and use sunscreen. Do not use sun lamps or tanning beds/booths.  Birth control pills may not work properly while you are taking this medicine. Talk to your doctor about using an extra method of birth control.  If you are being treated for a sexually transmitted infection, avoid sexual contact until you have finished your treatment. Your sexual partner may also need treatment.  Avoid antacids, aluminum, calcium, magnesium, and iron products for 4 hours before and 2 hours after taking a dose of this medicine.  If you are using this medicine to prevent malaria, you should still protect yourself from contact with mosquitos. Stay in screened-in areas, use mosquito  nets, keep your body covered, and use an insect repellent.  What side effects may I notice from receiving this medicine?  Side effects that you should report to your doctor or health care professional as soon as possible:  · allergic reactions like skin rash, itching or hives, swelling of the face, lips, or tongue  · difficulty breathing  · fever  · itching in the rectal or genital area  · pain on swallowing  · redness, blistering, peeling or loosening of the skin, including inside the mouth  · severe stomach pain or cramps  · unusual bleeding or bruising  · unusually weak or tired  · yellowing of the eyes or skin  Side effects that usually do not require medical attention (report to your doctor or health care professional if they continue or are bothersome):  · diarrhea  · loss of appetite  · nausea, vomiting  This list may not describe all possible side effects. Call your doctor for medical advice about side effects. You may report side effects to FDA at 4-773-FDA-2367.  Where should I keep my medicine?  Keep out of the reach of children.  Store at room temperature, below 30 degrees C (86 degrees F). Protect from light. Keep container tightly closed. Throw away any unused medicine after the expiration date. Taking this medicine after the expiration date can make you seriously ill.  NOTE:This sheet is a summary. It may not cover all possible information. If you have questions about this medicine, talk to your doctor, pharmacist, or health care provider. Copyright© 2016 [employer]    Oxycodone Hydrochloride, Acetaminophen Oral tablet  What is this medicine?  ACETAMINOPHEN; OXYCODONE (a set a ELODIA nohemi fen; ox i KOE done) is a pain reliever. It is used to treat moderate to severe pain.  This medicine may be used for other purposes; ask your health care provider or pharmacist if you have questions.  What should I tell my health care provider before I take this medicine?  They need to know if you have any of these  conditions:  · brain tumor  · Crohn's disease, inflammatory bowel disease, or ulcerative colitis  · drug abuse or addiction  · head injury  · heart or circulation problems  · if you often drink alcohol  · kidney disease or problems going to the bathroom  · liver disease  · lung disease, asthma, or breathing problems  · an unusual or allergic reaction to acetaminophen, oxycodone, other opioid analgesics, other medicines, foods, dyes, or preservatives  · pregnant or trying to get pregnant  · breast-feeding  How should I use this medicine?  Take this medicine by mouth with a full glass of water. Follow the directions on the prescription label. You can take it with or without food. If it upsets your stomach, take it with food. Take your medicine at regular intervals. Do not take it more often than directed.  Talk to your pediatrician regarding the use of this medicine in children. Special care may be needed.  Patients over 65 years old may have a stronger reaction and need a smaller dose.  Overdosage: If you think you have taken too much of this medicine contact a poison control center or emergency room at once.  NOTE: This medicine is only for you. Do not share this medicine with others.  What if I miss a dose?  If you miss a dose, take it as soon as you can. If it is almost time for your next dose, take only that dose. Do not take double or extra doses.  What may interact with this medicine?  · alcohol  · antihistamines  · barbiturates like amobarbital, butalbital, butabarbital, methohexital, pentobarbital, phenobarbital, thiopental, and secobarbital  · benztropine  · drugs for bladder problems like solifenacin, trospium, oxybutynin, tolterodine, hyoscyamine, and methscopolamine  · drugs for breathing problems like ipratropium and tiotropium  · drugs for certain stomach or intestine problems like propantheline, homatropine methylbromide, glycopyrrolate, atropine, belladonna, and dicyclomine  · general anesthetics like  etomidate, ketamine, nitrous oxide, propofol, desflurane, enflurane, halothane, isoflurane, and sevoflurane  · medicines for depression, anxiety, or psychotic disturbances  · medicines for sleep  · muscle relaxants  · naltrexone  · narcotic medicines (opiates) for pain  · phenothiazines like perphenazine, thioridazine, chlorpromazine, mesoridazine, fluphenazine, prochlorperazine, promazine, and trifluoperazine  · scopolamine  · tramadol  · trihexyphenidyl  This list may not describe all possible interactions. Give your health care provider a list of all the medicines, herbs, non-prescription drugs, or dietary supplements you use. Also tell them if you smoke, drink alcohol, or use illegal drugs. Some items may interact with your medicine.  What should I watch for while using this medicine?  Tell your doctor or health care professional if your pain does not go away, if it gets worse, or if you have new or a different type of pain. You may develop tolerance to the medicine. Tolerance means that you will need a higher dose of the medication for pain relief. Tolerance is normal and is expected if you take this medicine for a long time.  Do not suddenly stop taking your medicine because you may develop a severe reaction. Your body becomes used to the medicine. This does NOT mean you are addicted. Addiction is a behavior related to getting and using a drug for a non-medical reason. If you have pain, you have a medical reason to take pain medicine. Your doctor will tell you how much medicine to take. If your doctor wants you to stop the medicine, the dose will be slowly lowered over time to avoid any side effects.  You may get drowsy or dizzy. Do not drive, use machinery, or do anything that needs mental alertness until you know how this medicine affects you. Do not stand or sit up quickly, especially if you are an older patient. This reduces the risk of dizzy or fainting spells. Alcohol may interfere with the effect of  this medicine. Avoid alcoholic drinks.  There are different types of narcotic medicines (opiates) for pain. If you take more than one type at the same time, you may have more side effects. Give your health care provider a list of all medicines you use. Your doctor will tell you how much medicine to take. Do not take more medicine than directed. Call emergency for help if you have problems breathing.  The medicine will cause constipation. Try to have a bowel movement at least every 2 to 3 days. If you do not have a bowel movement for 3 days, call your doctor or health care professional.  Do not take Tylenol (acetaminophen) or medicines that have acetaminophen with this medicine. Too much acetaminophen can be very dangerous. Many nonprescription medicines contain acetaminophen. Always read the labels carefully to avoid taking more acetaminophen.  What side effects may I notice from receiving this medicine?  Side effects that you should report to your doctor or health care professional as soon as possible:  · allergic reactions like skin rash, itching or hives, swelling of the face, lips, or tongue  · breathing difficulties, wheezing  · confusion  · light headedness or fainting spells  · severe stomach pain  · unusually weak or tired  · yellowing of the skin or the whites of the eyes  Side effects that usually do not require medical attention (report to your doctor or health care professional if they continue or are bothersome):  · dizziness  · drowsiness  · nausea  · vomiting  This list may not describe all possible side effects. Call your doctor for medical advice about side effects. You may report side effects to FDA at 6-670-FDA-8404.  Where should I keep my medicine?  Keep out of the reach of children. This medicine can be abused. Keep your medicine in a safe place to protect it from theft. Do not share this medicine with anyone. Selling or giving away this medicine is dangerous and against the law.  Store at room  "temperature between 20 and 25 degrees C (68 and 77 degrees F). Keep container tightly closed. Protect from light.  This medicine may cause accidental overdose and death if it is taken by other adults, children, or pets. Flush any unused medicine down the toilet to reduce the chance of harm. Do not use the medicine after the expiration date.  NOTE: This sheet is a summary. It may not cover all possible information. If you have questions about this medicine, talk to your doctor, pharmacist, or health care provider.  NOTE:This sheet is a summary. It may not cover all possible information. If you have questions about this medicine, talk to your doctor, pharmacist, or health care provider. Copyright© 2016 [employer]              Discharge References/Attachments     SUCTION CURETTAGE (THERAPEUTIC DILATION &AMP; CURETTAGE, D &AMP; C) (ENGLISH)        Primary Diagnosis     Your primary diagnosis was:  Incomplete       Admission Information     Date & Time Provider Department CSN    2017  9:43 AM Eneida Garcia MD [facility] -  [id number]      Care Providers     Provider Role Specialty Primary office phone    Eneida Garcia MD Attending Provider Obstetrics and Gynecology [phone]    Eneida Garcia MD Surgeon  Obstetrics and Gynecology [phone]      Your Vitals Were     BP Pulse Temp Resp Height Weight    98/63 (BP Location: Right arm, Patient Position: Sitting, BP Method: Automatic) 82 98.3 °F (36.8 °C) (Oral) 16 5' 6" (1.676 m) 49.8 kg (109 lb 12.6 oz)    Last Period SpO2 BMI          10/08/2016 96% 17.72 kg/m2        Recent Lab Values     No lab values to display.      Pending Labs     Order Current Status    Specimen to Pathology - Surgery Collected (17 1219)      Allergies as of 2017        Reactions    Sulfa (Sulfonamide Antibiotics) Rash      [facility]     [facility] Nurse Care Line -  Assistance  Unless otherwise directed by your provider, please contact " [facility], our nurse care line that is available for 24/7 assistance.     Registered nurses in the [facility] provide clinical advisement, health education, appointment booking, and other advisory services.  Call for this free service at [phone].        Advance Directives     An advance directive is a document which, in the event you are no longer able to make decisions for yourself, tells your healthcare team what kind of treatment you do or do not want to receive, or who you would like to make those decisions for you.  If you do not currently have an advance directive, [facility] encourages you to create one.  For more information call:  [phone]), [phone] ([phone]),  or log on to [email or web address].        Smoking Cessation     If you would like to quit smoking:   You may be eligible for free services if you are a [place] resident and started smoking cigarettes before September 1, 1988.  Call the Smoking Cessation Trust (SCT) toll free at (610) 378-5798 or (288) 731-8990.   Call 5-694-QUIT-NOW if you do not meet the above criteria.            Language Assistance Services     ATTENTION: Language assistance services are available, free of charge. Please call [phone].      ATENCIÓN: Si habla español, tiene a vincent disposición servicios gratuitos de asistencia lingüística. Llame al [phone].     CHÚ Ý: N?u b?n nói Ti?ng Vi?t, có các d?ch v? h? tr? ngôn ng? mi?n phí dành cho b?n. G?i s? [phone].         [facility] -  complies with applicable Federal civil rights laws and does not discriminate on the basis of race, color, national origin, age, disability, or sex.

## 2017-02-02 NOTE — TRANSFER OF CARE
"Anesthesia Transfer of Care Note    Patient: Noemi Morales    Procedure(s) Performed: Procedure(s) (LRB):  D & C (SUCTION) (N/A)    Patient location: PACU    Anesthesia Type: general    Transport from OR: Transported from OR on room air with adequate spontaneous ventilation    Post pain: adequate analgesia    Post assessment: no apparent anesthetic complications    Post vital signs: stable    Level of consciousness: sedated    Nausea/Vomiting: no nausea/vomiting    Complications: none          Last vitals:   Visit Vitals    /77 (BP Location: Left arm, Patient Position: Lying, BP Method: Automatic)    Pulse 75    Temp 36.7 °C (98.1 °F) (Temporal)    Resp (!) 22    Ht 5' 6" (1.676 m)    Wt 49.8 kg (109 lb 12.6 oz)    LMP 10/08/2016    SpO2 100%    Breastfeeding No    BMI 17.72 kg/m2     "

## 2017-02-02 NOTE — OP NOTE
Operative Note       SURGERY DATE:  2017     PRE-OP DIAGNOSIS:  Incomplete  [O03.4]    POST-OP DIAGNOSIS:  Incomplete  [O03.4]    Procedure(s) (LRB):  D & C (SUCTION) (N/A)    Surgeon(s) and Role:     * Eneida Garcia MD - Primary    ASSISTANT:  None    TASKS PERFORMED BY ASSISTANT:  No assistant    ANESTHESIA: General    FINDINGS: Uterus 8 week size; small amount of residual products of conception present.      GRAFTS/IMPLANTS:  None    ESTIMATED BLOOD LOSS: 50 mL              COMPLICATIONS:  None    SPECIMEN:  products of conception    INDICATION:  Incomplete  that failed medical management with cytotec    DESCRIPTION OF PROCEDURE:    The patient was taken to the Operating Room where general        endotracheal anesthesia was induced and found to be adequate.  Pre-op antibiotics were given.     Her perineum was then prepped and draped in normal sterile fashion, and      bladder was drained in an in and out fashion with the red rubber             catheter.  Time out was performed.                                                                     A weighted sterile speculum was     then placed in the patient's vagina, and the anterior lip of the cervix      was grasped with single-tooth tenaculum.  The uterine cervix was then gently     dilated up to 8mm using a Hegar dilator.  An 8mm suction curette was     then advanced into the uterus and suction was applied.  Several passes       were  made and products of conception were removed.  Sharp      curettage was then performed.    Repeated suction curettage was then performed until it felt        that  all products of conception were removed.  Sharp curettage was then           performed again, and a gritty texture was noted on all 4 walls of the uterus.   The bilateral uterine cornu were sampled with the Anurag stone forceps.      The patient had no bleeding at the end of the case.    The single-tooth tenaculum was removed from the  anterior lip of the cervix and excellent hemostasis was noted.  Repeat bimanual exam revealed the uterus to be about 6week size and firm.   Sponge, lap and needle counts were correct  X 2.  She will go to Recovery in stable condition.               CONDITION: Good    DISPOSITION: PACU - hemodynamically stable.

## 2017-02-02 NOTE — ANESTHESIA PREPROCEDURE EVALUATION
02/02/2017  Noemi Morales is a 26 y.o., female.    [facility] Anesthesia Evaluation    I have reviewed the Patient Summary Reports.     I have reviewed the Medications.     Review of Systems  Anesthesia Hx:  No problems with previous Anesthesia  Denies Family Hx of Anesthesia complications.   Denies Personal Hx of Anesthesia complications.   Social:  Non-Smoker, No Alcohol Use    Hematology/Oncology:  Hematology Normal   Oncology Normal     EENT/Dental:EENT/Dental Normal   Cardiovascular:  Cardiovascular Normal     Pulmonary:  Pulmonary Normal    Renal/:  Renal/ Normal     Musculoskeletal:  Musculoskeletal Normal Scoliosis   Neurological:  Neurology Normal    Endocrine:  Endocrine Normal    Dermatological:  Skin Normal    Psych:   Psychiatric History Bipolar, ADHD         Physical Exam  General:  Well nourished       Chest/Lungs:  Chest/Lungs Findings: Clear to auscultation, Normal Respiratory Rate     Heart/Vascular:  Heart Findings: Rate: Normal        Mental Status:  Mental Status Findings:  Cooperative, Alert and Oriented         Anesthesia Plan  Type of Anesthesia, risks & benefits discussed:  Anesthesia Type:  general  Patient's Preference:   Intra-op Monitoring Plan:   Intra-op Monitoring Plan Comments:   Post Op Pain Control Plan:   Post Op Pain Control Plan Comments:   Induction:   IV  Beta Blocker:  Patient is not currently on a Beta-Blocker (No further documentation required).       Informed Consent: Patient understands risks and agrees with Anesthesia plan.  Questions answered. Anesthesia consent signed with patient.  ASA Score: 2     Day of Surgery Review of History & Physical: I have interviewed and examined the patient. I have reviewed the patient's H&P dated:            Ready For Surgery From Anesthesia Perspective.

## 2017-02-02 NOTE — BRIEF OP NOTE
[facility] -   Brief Operative Note     SUMMARY     Surgery Date: 2017     Surgeon(s) and Role:     * Eneida Garcia MD - Primary    Assisting Surgeon: None    Pre-op Diagnosis:  Incomplete  [O03.4]    Post-op Diagnosis:  Post-Op Diagnosis Codes:     * Incomplete  [O03.4]    Procedure(s) (LRB):  D & C (SUCTION) (N/A)    Anesthesia: General    Description of the findings of the procedure: Uterus 8 week size; small amount of residual products of conception present.      Estimated Blood Loss: 50 mL         Specimens:   Specimen (12h ago through future)    Start     Ordered    17 1214  Specimen to Pathology - Surgery  Once     Comments:  1) Products of Conception (perm)    Dx: Incomplete     17 1219          Discharge Note    SUMMARY     Admit Date: 2017    Discharge Date and Time: 2017 12:26 PM    Hospital Course (synopsis of major diagnoses, care, treatment, and services provided during the course of the hospital stay): The patient underwent a scheduled suction D&C for an incomplete  that failed cytotec management. Her procedure was uncomplicated, and she will be discharged to home from PACU in stable condition.     Final Diagnosis: Post-Op Diagnosis Codes:     * Incomplete  [O03.4]    Disposition: Home or Self Care    Follow Up/Patient Instructions:     Medications:  Reconciled Home Medications:   Current Discharge Medication List      START taking these medications    Details   doxycycline (MONODOX) 100 MG capsule Take 1 capsule (100 mg total) by mouth 2 (two) times daily.  Qty: 14 capsule, Refills: 0      oxycodone-acetaminophen (PERCOCET) 5-325 mg per tablet Take 1 tablet by mouth every 4 (four) hours as needed for Pain.  Qty: 15 tablet, Refills: 0         CONTINUE these medications which have NOT CHANGED    Details   lamotrigine (LAMICTAL) 200 MG tablet Take 200 mg by mouth 2 (two) times daily.             Discharge Procedure  Orders  Diet general     Activity as tolerated     Lifting restrictions   Order Comments: No lifting >20 pounds     No dressing needed     Call MD for:  temperature >100.4     Call MD for:  persistent nausea and vomiting or diarrhea     Call MD for:  severe uncontrolled pain     Call MD for:  redness, tenderness, or signs of infection (pain, swelling, redness, odor or green/yellow discharge around incision site)     Call MD for:  difficulty breathing or increased cough     Call MD for:  severe persistent headache     Call MD for:  worsening rash     Call MD for:  increased confusion or weakness     Call MD for:  persistent dizziness, light-headedness, or visual disturbances       Follow-up Information     Follow up with Eneida Garcia MD On 2/23/2017.    Specialties:  Obstetrics and Gynecology, Obstetrics    Why:  post-op check    Contact information:    [id number]  [place] [address] [phone]

## 2017-02-02 NOTE — INTERVAL H&P NOTE
The patient has been examined and the H&P has been reviewed:    I concur with the findings and no changes have occurred since H&P was written.    Anesthesia/Surgery risks, benefits and alternative options discussed and understood by patient/family.          Active Hospital Problems    Diagnosis  POA    *Incomplete  [O03.4]  Yes      Resolved Hospital Problems    Diagnosis Date Resolved POA   No resolved problems to display.

## 2017-02-02 NOTE — DISCHARGE INSTRUCTIONS
Discharge Instructions for Dilation and Curettage (D and C)  Your doctor performed dilation and curettage (D&C). The reasons for having this procedure vary from person to person. The D&C may be done to control heavy uterine bleeding, to find the cause of irregular bleeding, to perform an , or to remove pregnancy tissue if you have had a miscarriage.  Home care  · Take it easy. Rest for 2 days as needed.  · Return to your normal activities after 24 to 48 hours. You may also return to work at that time.  · Eat a normal diet.  · Take an over-the-counter pain reliever for pain, if needed.  · Remember, its OK to have bleeding for about a week after the procedure. The amount of bleeding should be similar to what you have during a normal period.  · Dont drive for 24 hours after the procedure unless specifically told by your provider that it is OK to do so.  · Dont have sexual intercourse or use tampons or douches until your doctor says its safe to do so.  Follow-up  · Make a follow-up appointment as directed by our staff.     When to call your doctor  Call your doctor right away if you have any of the following:  · Bleeding that soaks more than one sanitary pad in one hour  · Severe abdominal pain  · Severe cramps  · Fever above 100.4°F (38.0°C)  · A foul smelling vaginal discharge   © 3395-2664 [employer]. [address] [place], [place] [address]. All rights reserved. This information is not intended as a substitute for professional medical care. Always follow your healthcare professional's instructions.    General Information:    1. Do not drink alcoholic beverages including beer for 24 hours or as long as you are on pain medication..  2. Do not drive a motor vehicle, operate machinery or power tools, or signs legal papers for 24 hours or as long as you are on pain medication.   3. You may experience light-headedness, dizziness, and sleepiness following surgery. Please do not stay alone. A  responsible adult should be with you for this 24 hour period.  4. Go home and rest.  5. Progress slowly to a normal diet unless instructed.  Otherwise, begin with liquids such as soft drinks, then soup and crackers working up to solid foods. Drink plenty of nonalcoholic fluids.  6. Certain anesthetics and pain medications produce nausea and vomiting in certain individuals. If nausea becomes a problem at home, call you doctor.  7. A nurse will be calling you sometime after surgery. Do not be alarmed. This is our way of finding out how you are doing.  8. Several times every hour while you are awake, take 2-3 deep breaths and cough. If you had stomach surgery hold a pillow or rolled towel firmly against your stomach before you cough. This will help with any pain the cough might cause.  9. Several times every hour while you are awake, pump and flex your feet 5-6 times and do foot circles. This will help prevent blood clots.  10. Call your doctor for severe pain, bleeding, fever, or signs or symptoms of infection (pain, swelling, redness, foul odor, drainage).  11. You can contact your doctor anytime by [phone] for the [facility] (at [facility]) or [phone] for the [facility] on [facility].   [email or web address] is another way to contact your doctor if you are an active participant online with [facility].    Doxycycline Hyclate Oral capsule  What is this medicine?  DOXYCYCLINE (dox jam cook) is a tetracycline antibiotic. It kills certain bacteria or stops their growth. It is used to treat many kinds of infections, like dental, skin, respiratory, and urinary tract infections. It also treats acne, Lyme disease, malaria, and certain sexually transmitted infections.  This medicine may be used for other purposes; ask your health care provider or pharmacist if you have questions.  What should I tell my health care provider before I take this medicine?  They need to know if you have any of these  conditions:  · liver disease  · long exposure to sunlight like working outdoors  · stomach problems like colitis  · an unusual or allergic reaction to doxycycline, tetracycline antibiotics, other medicines, foods, dyes, or preservatives  · pregnant or trying to get pregnant  · breast-feeding  How should I use this medicine?  Take this medicine by mouth with a full glass of water. Follow the directions on the prescription label. It is best to take this medicine without food, but if it upsets your stomach take it with food. Take your medicine at regular intervals. Do not take your medicine more often than directed. Take all of your medicine as directed even if you think you are better. Do not skip doses or stop your medicine early.  Talk to your pediatrician regarding the use of this medicine in children. While this drug may be prescribed for selected conditions, precautions do apply.  Overdosage: If you think you have taken too much of this medicine contact a poison control center or emergency room at once.  NOTE: This medicine is only for you. Do not share this medicine with others.  What if I miss a dose?  If you miss a dose, take it as soon as you can. If it is almost time for your next dose, take only that dose. Do not take double or extra doses.  What may interact with this medicine?  · antacids  · barbiturates  · birth control pills  · bismuth subsalicylate  · carbamazepine  · methoxyflurane  · other antibiotics  · phenytoin  · vitamins that contain iron  · warfarin  This list may not describe all possible interactions. Give your health care provider a list of all the medicines, herbs, non-prescription drugs, or dietary supplements you use. Also tell them if you smoke, drink alcohol, or use illegal drugs. Some items may interact with your medicine.  What should I watch for while using this medicine?  Tell your doctor or health care professional if your symptoms do not improve.  Do not treat diarrhea with over  the counter products. Contact your doctor if you have diarrhea that lasts more than 2 days or if it is severe and watery.  Do not take this medicine just before going to bed. It may not dissolve properly when you lay down and can cause pain in your throat. Drink plenty of fluids while taking this medicine to also help reduce irritation in your throat.  This medicine can make you more sensitive to the sun. Keep out of the sun. If you cannot avoid being in the sun, wear protective clothing and use sunscreen. Do not use sun lamps or tanning beds/booths.  Birth control pills may not work properly while you are taking this medicine. Talk to your doctor about using an extra method of birth control.  If you are being treated for a sexually transmitted infection, avoid sexual contact until you have finished your treatment. Your sexual partner may also need treatment.  Avoid antacids, aluminum, calcium, magnesium, and iron products for 4 hours before and 2 hours after taking a dose of this medicine.  If you are using this medicine to prevent malaria, you should still protect yourself from contact with mosquitos. Stay in screened-in areas, use mosquito nets, keep your body covered, and use an insect repellent.  What side effects may I notice from receiving this medicine?  Side effects that you should report to your doctor or health care professional as soon as possible:  · allergic reactions like skin rash, itching or hives, swelling of the face, lips, or tongue  · difficulty breathing  · fever  · itching in the rectal or genital area  · pain on swallowing  · redness, blistering, peeling or loosening of the skin, including inside the mouth  · severe stomach pain or cramps  · unusual bleeding or bruising  · unusually weak or tired  · yellowing of the eyes or skin  Side effects that usually do not require medical attention (report to your doctor or health care professional if they continue or are  bothersome):  · diarrhea  · loss of appetite  · nausea, vomiting  This list may not describe all possible side effects. Call your doctor for medical advice about side effects. You may report side effects to FDA at 0-771-RDB-1185.  Where should I keep my medicine?  Keep out of the reach of children.  Store at room temperature, below 30 degrees C (86 degrees F). Protect from light. Keep container tightly closed. Throw away any unused medicine after the expiration date. Taking this medicine after the expiration date can make you seriously ill.  NOTE:This sheet is a summary. It may not cover all possible information. If you have questions about this medicine, talk to your doctor, pharmacist, or health care provider. Copyright© 2016 [employer]    Oxycodone Hydrochloride, Acetaminophen Oral tablet  What is this medicine?  ACETAMINOPHEN; OXYCODONE (a set a ELODIA nohemi fen; ox i KOE done) is a pain reliever. It is used to treat moderate to severe pain.  This medicine may be used for other purposes; ask your health care provider or pharmacist if you have questions.  What should I tell my health care provider before I take this medicine?  They need to know if you have any of these conditions:  · brain tumor  · Crohn's disease, inflammatory bowel disease, or ulcerative colitis  · drug abuse or addiction  · head injury  · heart or circulation problems  · if you often drink alcohol  · kidney disease or problems going to the bathroom  · liver disease  · lung disease, asthma, or breathing problems  · an unusual or allergic reaction to acetaminophen, oxycodone, other opioid analgesics, other medicines, foods, dyes, or preservatives  · pregnant or trying to get pregnant  · breast-feeding  How should I use this medicine?  Take this medicine by mouth with a full glass of water. Follow the directions on the prescription label. You can take it with or without food. If it upsets your stomach, take it with food. Take your medicine at  regular intervals. Do not take it more often than directed.  Talk to your pediatrician regarding the use of this medicine in children. Special care may be needed.  Patients over 65 years old may have a stronger reaction and need a smaller dose.  Overdosage: If you think you have taken too much of this medicine contact a poison control center or emergency room at once.  NOTE: This medicine is only for you. Do not share this medicine with others.  What if I miss a dose?  If you miss a dose, take it as soon as you can. If it is almost time for your next dose, take only that dose. Do not take double or extra doses.  What may interact with this medicine?  · alcohol  · antihistamines  · barbiturates like amobarbital, butalbital, butabarbital, methohexital, pentobarbital, phenobarbital, thiopental, and secobarbital  · benztropine  · drugs for bladder problems like solifenacin, trospium, oxybutynin, tolterodine, hyoscyamine, and methscopolamine  · drugs for breathing problems like ipratropium and tiotropium  · drugs for certain stomach or intestine problems like propantheline, homatropine methylbromide, glycopyrrolate, atropine, belladonna, and dicyclomine  · general anesthetics like etomidate, ketamine, nitrous oxide, propofol, desflurane, enflurane, halothane, isoflurane, and sevoflurane  · medicines for depression, anxiety, or psychotic disturbances  · medicines for sleep  · muscle relaxants  · naltrexone  · narcotic medicines (opiates) for pain  · phenothiazines like perphenazine, thioridazine, chlorpromazine, mesoridazine, fluphenazine, prochlorperazine, promazine, and trifluoperazine  · scopolamine  · tramadol  · trihexyphenidyl  This list may not describe all possible interactions. Give your health care provider a list of all the medicines, herbs, non-prescription drugs, or dietary supplements you use. Also tell them if you smoke, drink alcohol, or use illegal drugs. Some items may interact with your medicine.  What  should I watch for while using this medicine?  Tell your doctor or health care professional if your pain does not go away, if it gets worse, or if you have new or a different type of pain. You may develop tolerance to the medicine. Tolerance means that you will need a higher dose of the medication for pain relief. Tolerance is normal and is expected if you take this medicine for a long time.  Do not suddenly stop taking your medicine because you may develop a severe reaction. Your body becomes used to the medicine. This does NOT mean you are addicted. Addiction is a behavior related to getting and using a drug for a non-medical reason. If you have pain, you have a medical reason to take pain medicine. Your doctor will tell you how much medicine to take. If your doctor wants you to stop the medicine, the dose will be slowly lowered over time to avoid any side effects.  You may get drowsy or dizzy. Do not drive, use machinery, or do anything that needs mental alertness until you know how this medicine affects you. Do not stand or sit up quickly, especially if you are an older patient. This reduces the risk of dizzy or fainting spells. Alcohol may interfere with the effect of this medicine. Avoid alcoholic drinks.  There are different types of narcotic medicines (opiates) for pain. If you take more than one type at the same time, you may have more side effects. Give your health care provider a list of all medicines you use. Your doctor will tell you how much medicine to take. Do not take more medicine than directed. Call emergency for help if you have problems breathing.  The medicine will cause constipation. Try to have a bowel movement at least every 2 to 3 days. If you do not have a bowel movement for 3 days, call your doctor or health care professional.  Do not take Tylenol (acetaminophen) or medicines that have acetaminophen with this medicine. Too much acetaminophen can be very dangerous. Many nonprescription  medicines contain acetaminophen. Always read the labels carefully to avoid taking more acetaminophen.  What side effects may I notice from receiving this medicine?  Side effects that you should report to your doctor or health care professional as soon as possible:  · allergic reactions like skin rash, itching or hives, swelling of the face, lips, or tongue  · breathing difficulties, wheezing  · confusion  · light headedness or fainting spells  · severe stomach pain  · unusually weak or tired  · yellowing of the skin or the whites of the eyes  Side effects that usually do not require medical attention (report to your doctor or health care professional if they continue or are bothersome):  · dizziness  · drowsiness  · nausea  · vomiting  This list may not describe all possible side effects. Call your doctor for medical advice about side effects. You may report side effects to FDA at 2-299-FDA-8621.  Where should I keep my medicine?  Keep out of the reach of children. This medicine can be abused. Keep your medicine in a safe place to protect it from theft. Do not share this medicine with anyone. Selling or giving away this medicine is dangerous and against the law.  Store at room temperature between 20 and 25 degrees C (68 and 77 degrees F). Keep container tightly closed. Protect from light.  This medicine may cause accidental overdose and death if it is taken by other adults, children, or pets. Flush any unused medicine down the toilet to reduce the chance of harm. Do not use the medicine after the expiration date.  NOTE: This sheet is a summary. It may not cover all possible information. If you have questions about this medicine, talk to your doctor, pharmacist, or health care provider.  NOTE:This sheet is a summary. It may not cover all possible information. If you have questions about this medicine, talk to your doctor, pharmacist, or health care provider. Copyright© 2016 [employer]

## 2017-02-08 ENCOUNTER — HOSPITAL ENCOUNTER (EMERGENCY)
Facility: HOSPITAL | Age: 27
Discharge: HOME | End: 2017-02-08
Attending: EMERGENCY MEDICINE
Payer: MEDICAID

## 2017-02-08 VITALS
HEART RATE: 117 BPM | SYSTOLIC BLOOD PRESSURE: 139 MMHG | OXYGEN SATURATION: 99 % | HEIGHT: 66 IN | DIASTOLIC BLOOD PRESSURE: 75 MMHG | TEMPERATURE: 98 F | WEIGHT: 112 LBS | BODY MASS INDEX: 18 KG/M2 | RESPIRATION RATE: 18 BRPM

## 2017-02-08 DIAGNOSIS — F90.1 ATTENTION-DEFICIT HYPERACTIVITY DISORDER, PREDOMINANTLY HYPERACTIVE TYPE: Primary | ICD-10-CM

## 2017-02-08 PROCEDURE — 99281 EMR DPT VST MAYX REQ PHY/QHP: CPT

## 2017-02-08 RX ORDER — DEXTROAMPHETAMINE SACCHARATE, AMPHETAMINE ASPARTATE MONOHYDRATE, DEXTROAMPHETAMINE SULFATE AND AMPHETAMINE SULFATE 7.5; 7.5; 7.5; 7.5 MG/1; MG/1; MG/1; MG/1
30 CAPSULE, EXTENDED RELEASE ORAL EVERY MORNING
Qty: 30 CAPSULE | Refills: 0 | Status: SHIPPED | OUTPATIENT
Start: 2017-02-08 | End: 2017-02-21

## 2017-02-08 NOTE — ED AVS SNAPSHOT
[facility]  [address]  [place] [address]               Noemi Morales   2017 10:07 AM   ED    Description:  Female : 1990   Department:  [facility]           Your Care was Coordinated By:     Provider Role From To    Fahad Rosado MD Attending Provider 17 1004 --      Reason for Visit     Medication Refill           Diagnoses this Visit        Comments    Attention-deficit hyperactivity disorder, predominantly hyperactive type    -  Primary       ED Disposition     ED Disposition Condition Comment    Discharge             To Do List           Follow-up Information     Follow up with Jace Yadav MD.    Specialty:  Internal Medicine    Contact information:    [address]  [facility] [id number] [phone]         These Medications        Disp Refills Start End    dextroamphetamine-amphetamine (ADDERALL XR) 30 MG 24 hr capsule 30 capsule 0 2017     Take 1 capsule (30 mg total) by mouth every morning. - Oral    Pharmacy: [id number] - [facility] [address] AT [facility] Ph #: [phone]         [facility]     [facility] -  Assistance  Registered nurses in the [facility] provide clinical advisement, health education, appointment booking, and other advisory services.  Call for this free service at [phone].             Medications           Message regarding Medications     Verify the changes and/or additions to your medication regime listed below are the same as discussed with your clinician today.  If any of these changes or additions are incorrect, please notify your healthcare provider.        START taking these NEW medications        Refills    dextroamphetamine-amphetamine (ADDERALL XR) 30 MG 24 hr capsule 0    Sig: Take 1 capsule (30 mg total) by mouth every morning.    Class: Print     "Route: Oral           Verify that the below list of medications is an accurate representation of the medications you are currently taking.  If none reported, the list may be blank. If incorrect, please contact your healthcare provider. Carry this list with you in case of emergency.           Current Medications     dextroamphetamine-amphetamine (ADDERALL XR) 30 MG 24 hr capsule Take 1 capsule (30 mg total) by mouth every morning.    doxycycline (MONODOX) 100 MG capsule Take 1 capsule (100 mg total) by mouth 2 (two) times daily.    lamotrigine (LAMICTAL) 200 MG tablet Take 200 mg by mouth 2 (two) times daily.    oxycodone-acetaminophen (PERCOCET) 5-325 mg per tablet Take 1 tablet by mouth every 4 (four) hours as needed for Pain.           Clinical Reference Information           Your Vitals Were     BP Pulse Temp Resp Height Weight    139/75 (BP Location: Right arm, Patient Position: Sitting) 117 98.3 °F (36.8 °C) (Oral) 18 5' 6" (1.676 m) 50.8 kg (112 lb)    Last Period SpO2 BMI          10/08/2016 99% 18.08 kg/m2        Allergies as of 2/8/2017        Reactions    Sulfa (Sulfonamide Antibiotics) Rash      Immunizations Administered on Date of Encounter - 2/8/2017     None      ED Micro, Lab, POCT     None      ED Imaging Orders     None        Discharge Instructions         Treating ADHD: Medication  In many cases, medication is part of a childs treatment plan. These medications provide a steady supply of the chemicals needed to send and receive messages within the brain.    Sending messages  Certain stimulants cause some sites in the brain to send stronger messages. When the messages are stronger, the child has better control over attention and activity. Stimulants work quickly and last a few hours.  Receiving messages  Some antidepressants help the brain receive messages better. Used to treat depression and inattention, these medications are taken daily.  Be aware  It may take a few tries to find the best " medication for your child. The amount and time of use may also need to be adjusted. In some cases, your child may need to be checked for side effects. If medication doesnt help, think about having your child reevaluated.  Parents role  · Learn about the medication your child takes, any side effects that might occur, and what results you can expect.  · Seek a second opinion if you have concerns about how your childs treatment is being managed.  · Make sure you, the school staff, and other caregivers follow all directions for giving your child medication.  · Watch your child for positive changes both at home and in school. Keep track of any side effects. Tell the doctor what you or others observe.  · Avoid running low on medication. Some prescriptions are special and need extra time to fill.  Childs role  · How do you feel after you take your medication? Tell your parents and doctor how you feel.  · Your medication comes in a pill. If you cant swallow the whole pill, ask your parents how to make it easier.  · Learn when to take your pill. Remind your parents or teachers when it is time.  · If someone teases you about taking medication, talk to your parents or teacher. They can help you decide what to tell that person.  Date Last Reviewed: 12/22/2014  © 3753-5767 [employer]. [address] [place] [address], [place] [address]. All rights reserved. This information is not intended as a substitute for professional medical care. Always follow your healthcare professional's instructions.          Your Scheduled Appointments     Feb 23, 2017 10:15 AM CST   Established Patient Visit with MD Pearl Orozco - OB/ GYN (Pearl)    [address] [facility] [address] [phone]              Smoking Cessation     If you would like to quit smoking:   You may be eligible for free services if you are a [place] resident and started smoking cigarettes before September 1, 1988.  Call the  Smoking Cessation Trust ([facility] [[email or web address]]) toll free at (489) 433-2525 or (813) 422-8380.   Call 1-800-QUIT-NOW if you do not meet the above criteria.             [facility] complies with applicable Federal civil rights laws and does not discriminate on the basis of race, color, national origin, age, disability, or sex.        Language Assistance Services     ATTENTION: Language assistance services are available, free of charge. Please call [phone].      ATENCIÓN: Si habla jonathan, tiene a vincent disposición servicios gratuitos de asistencia lingüística. Llame al [phone].     CHÚ Ý: N?u b?n nói Ti?ng Vi?t, có các d?ch v? h? tr? ngôn ng? mi?n phí dành cho b?n. G?i s? [phone].

## 2017-02-08 NOTE — DISCHARGE INSTRUCTIONS
Treating ADHD: Medication  In many cases, medication is part of a childs treatment plan. These medications provide a steady supply of the chemicals needed to send and receive messages within the brain.    Sending messages  Certain stimulants cause some sites in the brain to send stronger messages. When the messages are stronger, the child has better control over attention and activity. Stimulants work quickly and last a few hours.  Receiving messages  Some antidepressants help the brain receive messages better. Used to treat depression and inattention, these medications are taken daily.  Be aware  It may take a few tries to find the best medication for your child. The amount and time of use may also need to be adjusted. In some cases, your child may need to be checked for side effects. If medication doesnt help, think about having your child reevaluated.  Parents role  · Learn about the medication your child takes, any side effects that might occur, and what results you can expect.  · Seek a second opinion if you have concerns about how your childs treatment is being managed.  · Make sure you, the school staff, and other caregivers follow all directions for giving your child medication.  · Watch your child for positive changes both at home and in school. Keep track of any side effects. Tell the doctor what you or others observe.  · Avoid running low on medication. Some prescriptions are special and need extra time to fill.  Childs role  · How do you feel after you take your medication? Tell your parents and doctor how you feel.  · Your medication comes in a pill. If you cant swallow the whole pill, ask your parents how to make it easier.  · Learn when to take your pill. Remind your parents or teachers when it is time.  · If someone teases you about taking medication, talk to your parents or teacher. They can help you decide what to tell that person.  Date Last Reviewed: 12/22/2014  © 6198-4166 [employer]. [address] [place] [address], [place] [address]. All rights reserved. This information is not intended as a substitute for professional medical care. Always follow your healthcare professional's instructions.

## 2017-02-08 NOTE — ED PROVIDER NOTES
"SCRIBE #1 NOTE: I, Anais Chandra, am scribing for, and in the presence of, Fahad Rosado MD. I have scribed the entire note.      History      Chief Complaint   Patient presents with    Medication Refill     pt states she needs an emergency refill of her adderall XR       Review of patient's allergies indicates:   Allergen Reactions    Sulfa (sulfonamide antibiotics) Rash        HPI   HPI    2017, 10:06 AM   History obtained from the patient      History of Present Illness: Noemi Morales is a 26 y.o. female patient who presents to the Emergency Department for a medication refill. Pt states that she just recently moved here and just got set up with a psychiatrist, but is unable to get an appointment for a few months. Pt is requesting a refill for her Adderall XR until she can f/u with her psychiatrist. Pt states, "I have been forgetting things without my medication." Sx have been constant and moderate in severity. No modifying factors noted. No associated sx included.  Pt denies any fever, chills, SI, HI. No further complaints at this time.      Arrival mode: Personal vehicle      PCP: Jace Yadav MD       Past Medical History:  Past Medical History   Diagnosis Date    Bipolar 2 disorder     Scoliosis        Past Surgical History:  Past Surgical History   Procedure Laterality Date    Dilation and curettage of uterus       section  10/20/2015     x 1    [place] tooth extraction           Family History:  Family History   Problem Relation Age of Onset    No Known Problems Mother     Breast cancer Neg Hx     Colon cancer Neg Hx     Ovarian cancer Neg Hx        Social History:  Social History     Social History Main Topics    Smoking status: Current Every Day Smoker     Packs/day: 0.25    Smokeless tobacco: Never Used      Comment: no smoking after m.n prior to surgery    Alcohol use 0.0 oz/week     0 Standard drinks or equivalent per week      Comment: socially  No " "alcohol prior to surgery    Drug use: No    Sexual activity: Not Currently     Partners: Male       ROS   Review of Systems   Constitutional: Negative for fever.   HENT: Negative for sore throat.    Respiratory: Negative for shortness of breath.    Cardiovascular: Negative for chest pain.   Gastrointestinal: Negative for nausea.   Genitourinary: Negative for dysuria.   Musculoskeletal: Negative for back pain.   Skin: Negative for rash.   Neurological: Negative for weakness.   Hematological: Does not bruise/bleed easily.   Psychiatric/Behavioral: Negative for hallucinations and suicidal ideas.        (+) ADHD (-) HI     Physical Exam    Initial Vitals   BP Pulse Resp Temp SpO2   02/08/17 1003 02/08/17 1003 02/08/17 1003 02/08/17 1003 02/08/17 1003   139/75 117 18 98.3 °F (36.8 °C) 99 %      Physical Exam  Nursing Notes and Vital Signs Reviewed.  Constitutional: Patient is in no acute distress. Awake and alert. Well-developed and well-nourished.  Head: Atraumatic. Normocephalic.  Eyes: PERRL. EOM intact. Conjunctivae are not pale. No scleral icterus.  ENT: Mucous membranes are moist. Oropharynx is clear and symmetric.    Neck: Supple. Full ROM. No lymphadenopathy.  Cardiovascular: Regular rate. Regular rhythm. No murmurs, rubs, or gallops. Distal pulses are 2+ and symmetric.  Pulmonary/Chest: No respiratory distress. Clear to auscultation bilaterally. No wheezing, rales, or rhonchi.  Musculoskeletal: Moves all extremities. No obvious deformities.   Skin: Warm and dry.  Neurological:  Alert, awake, and appropriate.  Normal speech.  No acute focal neurological deficits are appreciated.  Psychiatric: Normal affect. Good eye contact. Appropriate in content.    ED Course    Procedures  ED Vital Signs:  Vitals:    02/08/17 1003   BP: 139/75   Pulse: (!) 117   Resp: 18   Temp: 98.3 °F (36.8 °C)   TempSrc: Oral   SpO2: 99%   Weight: 50.8 kg (112 lb)   Height: 5' 6" (1.676 m)              The Emergency Provider reviewed the " vital signs and test results, which are outlined above.    ED Discussion     10:10 AM Discharge: Initial encounter.  Pt assessed at this time.  Discussed exam results, shared treatment plan, f/u instructions, and specific conditions for return. Answered their questions at this time. Pt understands and agrees to the plan. Pt is stable for discharge.     Discharge Medication List as of 2/8/2017 10:09 AM      START taking these medications    Details   dextroamphetamine-amphetamine (ADDERALL XR) 30 MG 24 hr capsule Take 1 capsule (30 mg total) by mouth every morning., Starting 2/8/2017, Until Discontinued, Print             Follow-up Information     Follow up with Jace Yadav MD.    Specialty:  Internal Medicine    Contact information:    [address]  [facility] [address] [phone]              Medical Decision Making              Scribe Attestation:   Scribe #1: I performed the above scribed service and the documentation accurately describes the services I performed. I attest to the accuracy of the note.    Attending:   Physician Attestation Statement for Scribe #1: I, Fahad Rosado MD, personally performed the services described in this documentation, as scribed by Anais Chandra, in my presence, and it is both accurate and complete.          Clinical Impression       ICD-10-CM ICD-9-CM   1. Attention-deficit hyperactivity disorder, predominantly hyperactive type F90.1 314.01       Disposition:   Disposition: Discharged  Condition: Stable         Fahad Rosado MD  02/08/17 1022

## 2017-02-20 NOTE — ANESTHESIA POSTPROCEDURE EVALUATION
"Anesthesia Post Evaluation    Patient: Noemi Morales    Procedure(s) Performed: Procedure(s) (LRB):  D & C (SUCTION) (N/A)    Final Anesthesia Type: general  Patient location during evaluation: PACU  Patient participation: Yes- Able to Participate  Level of consciousness: awake and alert and oriented  Post-procedure vital signs: reviewed and stable  Pain management: adequate  Airway patency: patent  PONV status at discharge: No PONV  Anesthetic complications: no      Cardiovascular status: hemodynamically stable  Respiratory status: unassisted and room air  Hydration status: euvolemic  Follow-up not needed.        Visit Vitals    BP (!) 112/59    Pulse 69    Temp 36.7 °C (98 °F) (Temporal)    Resp 17    Ht 5' 6" (1.676 m)    Wt 49.8 kg (109 lb 12.6 oz)    LMP 10/08/2016    SpO2 100%    Breastfeeding No    BMI 17.72 kg/m2       Pain/Francisco Score: No Data Recorded      "

## 2017-02-21 ENCOUNTER — HOSPITAL ENCOUNTER (EMERGENCY)
Facility: HOSPITAL | Age: 27
Discharge: HOME | End: 2017-02-21
Attending: EMERGENCY MEDICINE
Payer: MEDICAID

## 2017-02-21 VITALS
BODY MASS INDEX: 17.68 KG/M2 | HEART RATE: 105 BPM | SYSTOLIC BLOOD PRESSURE: 112 MMHG | OXYGEN SATURATION: 100 % | TEMPERATURE: 98 F | RESPIRATION RATE: 18 BRPM | WEIGHT: 110 LBS | HEIGHT: 66 IN | DIASTOLIC BLOOD PRESSURE: 67 MMHG

## 2017-02-21 DIAGNOSIS — Z76.0 MEDICATION REFILL: Primary | ICD-10-CM

## 2017-02-21 PROCEDURE — 99281 EMR DPT VST MAYX REQ PHY/QHP: CPT

## 2017-02-21 RX ORDER — DEXTROAMPHETAMINE SACCHARATE, AMPHETAMINE ASPARTATE MONOHYDRATE, DEXTROAMPHETAMINE SULFATE AND AMPHETAMINE SULFATE 7.5; 7.5; 7.5; 7.5 MG/1; MG/1; MG/1; MG/1
30 CAPSULE, EXTENDED RELEASE ORAL EVERY MORNING
Qty: 4 CAPSULE | Refills: 0 | Status: SHIPPED | OUTPATIENT
Start: 2017-02-21 | End: 2017-04-23

## 2017-02-21 RX ORDER — LAMOTRIGINE 200 MG/1
200 TABLET ORAL 2 TIMES DAILY
Qty: 60 TABLET | Refills: 0 | Status: SHIPPED | OUTPATIENT
Start: 2017-02-21 | End: 2017-03-18

## 2017-02-21 NOTE — ED AVS SNAPSHOT
[facility]  [address] [place] [address]               Noemi Morales   2017  1:23 PM   ED    Description:  Female : 1990   Department:  [facility]           Your Care was Coordinated By:     Provider Role From To    Jose Hunt MD Attending Provider 17 1320 --      Reason for Visit     Medication Refill           Diagnoses this Visit        Comments    Medication refill    -  Primary       ED Disposition     None           To Do List           Follow-up Information     Follow up with Jace Yadav MD In 3 days.    Specialty:  Internal Medicine    Contact information:    [address] [facility] [id number] [phone]          Follow up with [facility].    Specialty:  Emergency Medicine    Why:  If symptoms worsen, Or worsening condition or any other major concern    Contact information:    [address] [place] [address] [phone]       These Medications        Disp Refills Start End    lamotrigine (LAMICTAL) 200 MG tablet 60 tablet 0 2017     Take 1 tablet (200 mg total) by mouth 2 (two) times daily. - Oral    Pharmacy: [facility] [id number] [place] [address] [address] [facility] Ph #: [phone]       dextroamphetamine-amphetamine (ADDERALL XR) 30 MG 24 hr capsule 4 capsule 0 2017     Take 1 capsule (30 mg total) by mouth every morning. - Oral    Pharmacy: [facility] [address] [facility] [address] [facility] Ph #: [phone]         [facility]     [facility] Care [facility] -  Assistance  Registered nurses in the [facility] provide clinical advisement, health education, appointment booking, and other advisory services.  Call for this free service at [phone].             Medications           Message regarding  "Medications     Verify the changes and/or additions to your medication regime listed below are the same as discussed with your clinician today.  If any of these changes or additions are incorrect, please notify your healthcare provider.        CHANGE how you are taking these medications     Start Taking Instead of    lamotrigine (LAMICTAL) 200 MG tablet lamotrigine (LAMICTAL) 200 MG tablet    Dosage:  Take 1 tablet (200 mg total) by mouth 2 (two) times daily. Dosage:  Take 200 mg by mouth 2 (two) times daily.      STOP taking these medications     oxycodone-acetaminophen (PERCOCET) 5-325 mg per tablet Take 1 tablet by mouth every 4 (four) hours as needed for Pain.           Verify that the below list of medications is an accurate representation of the medications you are currently taking.  If none reported, the list may be blank. If incorrect, please contact your healthcare provider. Carry this list with you in case of emergency.           Current Medications     dextroamphetamine-amphetamine (ADDERALL XR) 30 MG 24 hr capsule Take 1 capsule (30 mg total) by mouth every morning.    lamotrigine (LAMICTAL) 200 MG tablet Take 1 tablet (200 mg total) by mouth 2 (two) times daily.           Clinical Reference Information           Your Vitals Were     BP Pulse Temp Resp Height Weight    112/67 (BP Location: Right arm, Patient Position: Sitting) 105 98.1 °F (36.7 °C) (Oral) 18 5' 6" (1.676 m) 49.9 kg (110 lb)    Last Period SpO2 BMI          10/08/2016 100% 17.75 kg/m2        Allergies as of 2/21/2017        Reactions    Sulfa (Sulfonamide Antibiotics) Rash      Immunizations Administered on Date of Encounter - 2/21/2017     None      ED Micro, Lab, POCT     None      ED Imaging Orders     None      Discharge References/Attachments     ADHD, TREATING: LEARNING MORE (ENGLISH)    ADHD, WHAT IS (ENGLISH)    LAMOTRIGINE TABLETS (ENGLISH)      Your Scheduled Appointments     Feb 23, 2017 10:15 AM CST   Established Patient Visit " with MD Pearl Orozco - OB/ GYN ([facility]) [address] [facility] [address] [phone]              Smoking Cessation     If you would like to quit smoking:   You may be eligible for free services if you are a [place] resident and started smoking cigarettes before September 1, 1988.  Call the Smoking Cessation Trust ([facility]CT) toll free at (089) 879-6543 or (831) 703-4608.   Call 1-800-QUIT-NOW if you do not meet the above criteria.             [facility] complies with applicable Federal civil rights laws and does not discriminate on the basis of race, color, national origin, age, disability, or sex.        Language Assistance Services     ATTENTION: Language assistance services are available, free of charge. Please call [phone].      ATENCIÓN: Si habla español, tiene a vincent disposición servicios gratuitos de asistencia lingüística. Llame al [phone].     CHÚ Ý: N?u b?n nói Ti?ng Vi?t, có các d?ch v? h? tr? ngôn ng? mi?n phí dành cho b?n. G?i s? [phone].

## 2017-02-21 NOTE — ED PROVIDER NOTES
"SCRIBE #1 NOTE: I, Anais Chandra/ Kishan Pedersen, am scribing for, and in the presence of, Jose Hunt MD. I have scribed the entire note.      History      Chief Complaint   Patient presents with    Medication Refill     pt states " I lost my medicines and I need more."       Review of patient's allergies indicates:   Allergen Reactions    Sulfa (sulfonamide antibiotics) Rash        HPI   HPI    2017, 1:30 PM   History obtained from the patient      History of Present Illness: Noemi Morales is a 26 y.o. female patient who presents to the Emergency Department for an evaluation of fatigue due to running out of her medications which onset gradually yesterday. Pt reports she has run out of her adderall xr 30 mg and Lamictal 200 mg BID. She reports she has an appointment with her PCP in 5 days, but has run out and is going through withdrawal. No mitigating or exacerbating factors reported. Pt reports associated difficulty concentrating. Patient denies any fever, chills, neck pain/stiffness, visual disturbance/photophobia, extremity weakness/numbness, dizziness, and all other sxs at this time. Pt reports she is a smoker. No further complaints or concerns at this time.     Arrival mode: Personal vehicle    PCP: Jace Yadav MD       Past Medical History:  Past Medical History:   Diagnosis Date    ADHD (attention deficit hyperactivity disorder)     Bipolar 2 disorder     Scoliosis        Past Surgical History:  Past Surgical History:   Procedure Laterality Date     SECTION  10/20/2015    x 1    DILATION AND CURETTAGE OF UTERUS      WISDOM TOOTH EXTRACTION           Family History:  Family History   Problem Relation Age of Onset    No Known Problems Mother     Breast cancer Neg Hx     Colon cancer Neg Hx     Ovarian cancer Neg Hx        Social History:  Social History     Social History Main Topics    Smoking status: Current Every Day Smoker     Packs/day: 0.25    Smokeless " tobacco: Never Used      Comment: no smoking after m.n prior to surgery    Alcohol use 0.0 oz/week     0 Standard drinks or equivalent per week      Comment: socially  No alcohol prior to surgery    Drug use: No    Sexual activity: Not Currently     Partners: Male       ROS   Review of Systems   Constitutional: Positive for fatigue. Negative for chills, diaphoresis and fever.        (+) Difficulty concentrating   HENT: Negative for sore throat.    Respiratory: Negative for shortness of breath.    Cardiovascular: Negative for chest pain.   Gastrointestinal: Negative for abdominal pain, constipation, diarrhea, nausea and vomiting.   Genitourinary: Negative for dysuria.   Musculoskeletal: Negative for back pain.   Skin: Negative for rash.   Neurological: Negative for dizziness, weakness, light-headedness, numbness and headaches.   Hematological: Does not bruise/bleed easily.   Psychiatric/Behavioral: Negative for hallucinations and suicidal ideas.     Physical Exam    Initial Vitals   BP Pulse Resp Temp SpO2   02/21/17 1305 02/21/17 1305 02/21/17 1305 02/21/17 1305 02/21/17 1305   112/67 105 18 98.1 °F (36.7 °C) 100 %      Physical Exam  Nursing Notes and Vital Signs Reviewed.  Constitutional: Patient is in no acute distress. Awake and alert. Well-developed and well-nourished.  Head: Atraumatic. Normocephalic.  Eyes: PERRL. EOM intact. Conjunctivae are not pale. No scleral icterus.  ENT: Mucous membranes are moist. Oropharynx is clear and symmetric.    Neck: Supple. Full ROM. No lymphadenopathy.  Cardiovascular: Regular rate. Regular rhythm. No murmurs, rubs, or gallops. Distal pulses are 2+ and symmetric.  Pulmonary/Chest: No respiratory distress. Clear to auscultation bilaterally. No wheezing, rales, or rhonchi.  Abdominal: Soft and non-distended.  Musculoskeletal: Moves all extremities. No obvious deformities. No edema. No calf tenderness.  Skin: Warm and dry.  Neurological:  Alert, awake, and appropriate.   "Normal speech.  No acute focal neurological deficits are appreciated.  Psychiatric: Normal affect. Good eye contact. Appropriate in content.    ED Course    Procedures  ED Vital Signs:  Vitals:    02/21/17 1305   BP: 112/67   Pulse: 105   Resp: 18   Temp: 98.1 °F (36.7 °C)   TempSrc: Oral   SpO2: 100%   Weight: 49.9 kg (110 lb)   Height: 5' 6" (1.676 m)              The Emergency Provider reviewed the vital signs and test results, which are outlined above.    ED Discussion     1:52 PM:Discharge: Initial encounter.  Pt assessed at this time.  Discussed exam results, shared treatment plan, f/u instructions, and specific conditions for return. Answered their questions at this time. Pt understands and agrees to the plan. Pt is stable for discharge.     I discussed with patient and/or family/caretaker that evaluation in the ED does not suggest any emergent or life threatening medical conditions requiring immediate intervention beyond what was provided in the ED, and I believe patient is safe for discharge.  Regardless, an unremarkable evaluation in the ED does not preclude the development or presence of a serious of life threatening condition. As such, patient was instructed to return immediately for any worsening or change in current symptoms.      Follow-up Information     Follow up with Jace Yadav MD In 3 days.    Specialty:  Internal Medicine    Contact information:    [address] [facility] [address] [phone]          Follow up with [facility].    Specialty:  Emergency Medicine    Why:  If symptoms worsen, Or worsening condition or any other major concern    Contact information:    [address] [facility] [address] [phone]            Medical Decision Making        Additional MDM:   Smoking Cessation: The patient is a smoker. The patient was counseled on smoking cessation for: 3 minutes. The patient was counseled on tobacco related  health " complications.        Scribe Attestation:   Scribe #1: I performed the above scribed service and the documentation accurately describes the services I performed. I attest to the accuracy of the note.    Attending:   Physician Attestation Statement for Scribe #1: I, Jose Hunt MD, personally performed the services described in this documentation, as scribed by Anais Chandra/ Kishan Pedersen, in my presence, and it is both accurate and complete.          Clinical Impression       ICD-10-CM ICD-9-CM   1. Medication refill Z76.0 V68.1       Disposition:   Disposition: Discharged  Condition: Stable         Jose Hunt MD  02/23/17 [id number]

## 2017-03-18 ENCOUNTER — HOSPITAL ENCOUNTER (EMERGENCY)
Facility: HOSPITAL | Age: 27
Discharge: HOME | End: 2017-03-18
Payer: MEDICAID

## 2017-03-18 VITALS
HEIGHT: 66 IN | BODY MASS INDEX: 17.19 KG/M2 | HEART RATE: 110 BPM | RESPIRATION RATE: 18 BRPM | DIASTOLIC BLOOD PRESSURE: 74 MMHG | SYSTOLIC BLOOD PRESSURE: 112 MMHG | OXYGEN SATURATION: 98 % | TEMPERATURE: 99 F | WEIGHT: 107 LBS

## 2017-03-18 DIAGNOSIS — Z76.0 MEDICATION REFILL: Primary | ICD-10-CM

## 2017-03-18 PROCEDURE — 99281 EMR DPT VST MAYX REQ PHY/QHP: CPT

## 2017-03-18 RX ORDER — LAMOTRIGINE 200 MG/1
200 TABLET ORAL 2 TIMES DAILY
Qty: 60 TABLET | Refills: 2 | Status: SHIPPED | OUTPATIENT
Start: 2017-03-18 | End: 2017-08-22

## 2017-03-18 NOTE — ED PROVIDER NOTES
SCRIBE #1 NOTE: I, Phoebe Wright, am scribing for, and in the presence of, KAYLA Oneill. I have scribed the entire note.      History      Chief Complaint   Patient presents with    Medication Refill     pt. needs refill on lamictal        Review of patient's allergies indicates:   Allergen Reactions    Sulfa (sulfonamide antibiotics) Rash        HPI   HPI    3/18/2017, 2:49 PM   History obtained from the patient      History of Present Illness: Noemi Morales is a 26 y.o. female patient who presents to the Emergency Department for medication refill which onset gradually today. Symptoms are episodic and moderate in severity. Pt is requesting medication refill for lamictal. Pt could not get an appointment with PCP for medication refill. No mitigating or exacerbating factors reported. No associated sxs. Patient denies any fever, n/v/d, abdominal pain, CP, SOB, flank pain, dysuria, hematuria, back pain, HA, and all other sxs at this time. No further complaints or concerns at this time.         Arrival mode: Personal vehicle    PCP: Jace Yadav MD       Past Medical History:  Past Medical History:   Diagnosis Date    ADHD (attention deficit hyperactivity disorder)     Bipolar 2 disorder     Scoliosis        Past Surgical History:  Past Surgical History:   Procedure Laterality Date     SECTION  10/20/2015    x 1    DILATION AND CURETTAGE OF UTERUS      WISDOM TOOTH EXTRACTION           Family History:  Family History   Problem Relation Age of Onset    No Known Problems Mother     Breast cancer Neg Hx     Colon cancer Neg Hx     Ovarian cancer Neg Hx        Social History:  Social History     Social History Main Topics    Smoking status: Current Every Day Smoker     Packs/day: 0.25    Smokeless tobacco: Never Used      Comment: no smoking after m.n prior to surgery    Alcohol use 0.0 oz/week     0 Standard drinks or equivalent per week      Comment: socially  No alcohol  prior to surgery    Drug use: No    Sexual activity: Not Currently     Partners: Male       ROS   Review of Systems   Constitutional: Negative for fever.        (+) medication refill   HENT: Negative for sore throat.    Respiratory: Negative for shortness of breath.    Cardiovascular: Negative for chest pain.   Gastrointestinal: Negative for abdominal pain, diarrhea, nausea and vomiting.   Genitourinary: Negative for decreased urine volume, difficulty urinating, dysuria, flank pain and hematuria.   Musculoskeletal: Negative for back pain.   Skin: Negative for rash.   Neurological: Negative for weakness and headaches.   Hematological: Does not bruise/bleed easily.       Physical Exam    Initial Vitals   BP Pulse Resp Temp SpO2   03/18/17 1410 03/18/17 1410 03/18/17 1410 03/18/17 1410 03/18/17 1410   112/74 110 18 98.7 °F (37.1 °C) 98 %      Physical Exam  Nursing Notes and Vital Signs Reviewed.  Constitutional: Patient is in no acute distress. Awake and alert. Well-developed and well-nourished.  Head: Atraumatic. Normocephalic.  Eyes: PERRL. EOM intact. Conjunctivae are not pale. No scleral icterus.  ENT: Mucous membranes are moist. Oropharynx is clear and symmetric.    Neck: Supple. Full ROM. No lymphadenopathy.  Cardiovascular: Regular rate. Regular rhythm. No murmurs, rubs, or gallops. Distal pulses are 2+ and symmetric.  Pulmonary/Chest: No respiratory distress. Clear to auscultation bilaterally. No wheezing, rales, or rhonchi.  Musculoskeletal: Moves all extremities. No obvious deformities. No edema. No calf tenderness.  Skin: Warm and dry.  Neurological:  Alert, awake, and appropriate.  Normal speech.  No acute focal neurological deficits are appreciated.  Psychiatric: Normal affect. Good eye contact. Appropriate in content.    ED Course    Procedures  ED Vital Signs:  Vitals:    03/18/17 1410   BP: 112/74   Pulse: 110   Resp: 18   Temp: 98.7 °F (37.1 °C)   TempSrc: Oral   SpO2: 98%   Weight: 48.5 kg (107 lb)  "  Height: 5' 6" (1.676 m)            The Emergency Provider reviewed the vital signs and test results, which are outlined above.    ED Discussion     2:52 PM: Reassessed pt at this time. Discussed with pt all pertinent ED information and plan of tx. Gave pt all f/u and return to the ED instructions. All questions and concerns were addressed at this time. Pt expresses understanding of information and instructions, and is comfortable with plan to discharge. Pt is stable for discharge.        ED Medication(s):  Medications - No data to display    New Prescriptions    No medications on file             Medical Decision Making              Scribe Attestation:   Scribe #1: I performed the above scribed service and the documentation accurately describes the services I performed. I attest to the accuracy of the note.    Attending:   Physician Attestation Statement for Scribe #1: I, KAYLA Oneill, personally performed the services described in this documentation, as scribed by Phoebe Wright, in my presence, and it is both accurate and complete.          Clinical Impression       ICD-10-CM ICD-9-CM   1. Medication refill Z76.0 V68.1       Disposition:   Disposition: Discharged  Condition: Stable         KAYLA Low  03/18/17 1454       KAYLA Low  03/18/17 1454       KAYLA Low  03/18/17 1455    "

## 2017-03-18 NOTE — DISCHARGE INSTRUCTIONS
Lamotrigine tablets  What is this medicine?  LAMOTRIGINE (la ANU tri jeen) is used to control seizures in adults and children with epilepsy and Lennox-Gastaut syndrome. It is also used in adults to treat bipolar disorder.  How should I use this medicine?  Take this medicine by mouth with a glass of water. Follow the directions on the prescription label. Do not chew these tablets. If this medicine upsets your stomach, take it with food or milk. Take your doses at regular intervals. Do not take your medicine more often than directed.  A special MedGuide will be given to you by the pharmacist with each new prescription and refill. Be sure to read this information carefully each time.  Talk to your pediatrician regarding the use of this medicine in children. While this drug may be prescribed for children as young as 2 years for selected conditions, precautions do apply.  What side effects may I notice from receiving this medicine?  Side effects you should report to your doctor or health care professional as soon as possible:  · allergic reactions like skin rash, itching or hives, swelling of the face, lips, or tongue  · blurred or double vision  · difficulty walking or controlling muscle movements  · fever  · headache, stiff neck, and sensitivity to light  · painful sores in the mouth, eyes, or nose  · redness, blistering, peeling or loosening of the skin, including inside the mouth  · severe muscle pain  · swollen lymph glands  · uncontrollable eye movements  · unusual bruising or bleeding  · unusually weak or tired  · vomiting  · worsening of mood, thoughts or actions of suicide or dying  · yellowing of the eyes or skin  Side effects that usually do not require medical attention (report to your doctor or health care professional if they continue or are bothersome):  · diarrhea or constipation  · difficulty sleeping  · nausea  · tremors  What may interact with this medicine?  · carbamazepine  · female hormones,  including contraceptive or birth control pills  · methotrexate  · phenobarbital  · phenytoin  · primidone  · pyrimethamine  · rifampin  · trimethoprim  · valproic acid  What if I miss a dose?  If you miss a dose, take it as soon as you can. If it is almost time for your next dose, take only that dose. Do not take double or extra doses.  Where should I keep my medicine?  Keep out of reach of children.  Store at room temperature between 15 and 30 degrees C (59 and 86 degrees F). Throw away any unused medicine after the expiration date.  What should I tell my health care provider before I take this medicine?  They need to know if you have any of these conditions:  · a history of depression or bipolar disorder  · aseptic meningitis during prior use of lamotrigine  · folate deficiency  · kidney disease  · liver disease  · suicidal thoughts, plans, or attempt; a previous suicide attempt by you or a family member  · an unusual or allergic reaction to lamotrigine or other seizure medications, other medicines, foods, dyes, or preservatives  · pregnant or trying to get pregnant  · breast-feeding  What should I watch for while using this medicine?  Visit your doctor or health care professional for regular checks on your progress. If you take this medicine for seizures, wear a Medic Alert bracelet or necklace. Carry an identification card with information about your condition, medicines, and doctor or health care professional.  It is important to take this medicine exactly as directed. When first starting treatment, your dose will need to be adjusted slowly. It may take weeks or months before your dose is stable. You should contact your doctor or health care professional if your seizures get worse or if you have any new types of seizures. Do not stop taking this medicine unless instructed by your doctor or health care professional. Stopping your medicine suddenly can increase your seizures or their severity.  Contact your  doctor or health care professional right away if you develop a rash while taking this medicine. Rashes may be very severe and sometimes require treatment in the hospital. Deaths from rashes have occurred. Serious rashes occur more often in children than adults taking this medicine. It is more common for these serious rashes to occur during the first 2 months of treatment, but a rash can occur at any time.  You may get drowsy, dizzy, or have blurred vision. Do not drive, use machinery, or do anything that needs mental alertness until you know how this medicine affects you. To reduce dizzy or fainting spells, do not sit or stand up quickly, especially if you are an older patient. Alcohol can increase drowsiness and dizziness. Avoid alcoholic drinks.  If you are taking this medicine for bipolar disorder, it is important to report any changes in your mood to your doctor or health care professional. If your condition gets worse, you get mentally depressed, feel very hyperactive or manic, have difficulty sleeping, or have thoughts of hurting yourself or committing suicide, you need to get help from your health care professional right away. If you are a caregiver for someone taking this medicine for bipolar disorder, you should also report these behavioral changes right away. The use of this medicine may increase the chance of suicidal thoughts or actions. Pay special attention to how you are responding while on this medicine.  Your mouth may get dry. Chewing sugarless gum or sucking hard candy, and drinking plenty of water may help. Contact your doctor if the problem does not go away or is severe.  Women who become pregnant while using this medicine may enroll in the [place] Antiepileptic Drug Pregnancy Registry by calling 1-649.135.6684. This registry collects information about the safety of antiepileptic drug use during pregnancy.  Date Last Reviewed:   NOTE:This sheet is a summary. It may not cover all possible  information. If you have questions about this medicine, talk to your doctor, pharmacist, or health care provider. Copyright© 2016 [employer]

## 2017-03-18 NOTE — ED AVS SNAPSHOT
[facility]  [address]  [facility] [address]               Noemi Morales   3/18/2017  2:35 PM   ED    Description:  Female : 1990   Department:  [facility]            Your Care was Coordinated By:     Provider Role From To    KAYLA Low Physician Assistant [phone] --      Reason for Visit     Medication Refill           ED Disposition     None           To Do List            These Medications        Disp Refills Start End    lamotrigine (LAMICTAL) 200 MG tablet 60 tablet 2 3/18/2017     Take 1 tablet (200 mg total) by mouth 2 (two) times daily. - Oral    Pharmacy: [id number] - [facility] [address] [place] [address] AT [facility]  #: [phone]         [facility]     [facility] Nurse Care [facility] -  Assistance  Registered nurses in the [facility] provide clinical advisement, health education, appointment booking, and other advisory services.  Call for this free service at [phone].             Medications           Message regarding Medications     Verify the changes and/or additions to your medication regime listed below are the same as discussed with your clinician today.  If any of these changes or additions are incorrect, please notify your healthcare provider.             Verify that the below list of medications is an accurate representation of the medications you are currently taking.  If none reported, the list may be blank. If incorrect, please contact your healthcare provider. Carry this list with you in case of emergency.           Current Medications     dextroamphetamine-amphetamine (ADDERALL XR) 30 MG 24 hr capsule Take 1 capsule (30 mg total) by mouth every morning.    lamotrigine (LAMICTAL) 200 MG tablet Take 1 tablet (200 mg total) by mouth 2 (two) times daily.           Clinical Reference Information           Your Vitals Were   "   BP Pulse Temp Resp Height Weight    112/74 (BP Location: Right arm, Patient Position: Sitting) 110 98.7 °F (37.1 °C) (Oral) 18 5' 6" (1.676 m) 48.5 kg (107 lb)    SpO2 BMI             98% 17.27 kg/m2         Allergies as of 3/18/2017        Reactions    Sulfa (Sulfonamide Antibiotics) Rash      Immunizations Administered on Date of Encounter - 3/18/2017     None      ED Micro, Lab, POCT     None      ED Imaging Orders     None        Discharge Instructions         Lamotrigine tablets  What is this medicine?  LAMOTRIGINE (la ANU tri jeen) is used to control seizures in adults and children with epilepsy and Lennox-Gastaut syndrome. It is also used in adults to treat bipolar disorder.  How should I use this medicine?  Take this medicine by mouth with a glass of water. Follow the directions on the prescription label. Do not chew these tablets. If this medicine upsets your stomach, take it with food or milk. Take your doses at regular intervals. Do not take your medicine more often than directed.  A special MedGuide will be given to you by the pharmacist with each new prescription and refill. Be sure to read this information carefully each time.  Talk to your pediatrician regarding the use of this medicine in children. While this drug may be prescribed for children as young as 2 years for selected conditions, precautions do apply.  What side effects may I notice from receiving this medicine?  Side effects you should report to your doctor or health care professional as soon as possible:  · allergic reactions like skin rash, itching or hives, swelling of the face, lips, or tongue  · blurred or double vision  · difficulty walking or controlling muscle movements  · fever  · headache, stiff neck, and sensitivity to light  · painful sores in the mouth, eyes, or nose  · redness, blistering, peeling or loosening of the skin, including inside the mouth  · severe muscle pain  · swollen lymph glands  · uncontrollable eye " movements  · unusual bruising or bleeding  · unusually weak or tired  · vomiting  · worsening of mood, thoughts or actions of suicide or dying  · yellowing of the eyes or skin  Side effects that usually do not require medical attention (report to your doctor or health care professional if they continue or are bothersome):  · diarrhea or constipation  · difficulty sleeping  · nausea  · tremors  What may interact with this medicine?  · carbamazepine  · female hormones, including contraceptive or birth control pills  · methotrexate  · phenobarbital  · phenytoin  · primidone  · pyrimethamine  · rifampin  · trimethoprim  · valproic acid  What if I miss a dose?  If you miss a dose, take it as soon as you can. If it is almost time for your next dose, take only that dose. Do not take double or extra doses.  Where should I keep my medicine?  Keep out of reach of children.  Store at room temperature between 15 and 30 degrees C (59 and 86 degrees F). Throw away any unused medicine after the expiration date.  What should I tell my health care provider before I take this medicine?  They need to know if you have any of these conditions:  · a history of depression or bipolar disorder  · aseptic meningitis during prior use of lamotrigine  · folate deficiency  · kidney disease  · liver disease  · suicidal thoughts, plans, or attempt; a previous suicide attempt by you or a family member  · an unusual or allergic reaction to lamotrigine or other seizure medications, other medicines, foods, dyes, or preservatives  · pregnant or trying to get pregnant  · breast-feeding  What should I watch for while using this medicine?  Visit your doctor or health care professional for regular checks on your progress. If you take this medicine for seizures, wear a Medic Alert bracelet or necklace. Carry an identification card with information about your condition, medicines, and doctor or health care professional.  It is important to take this medicine  exactly as directed. When first starting treatment, your dose will need to be adjusted slowly. It may take weeks or months before your dose is stable. You should contact your doctor or health care professional if your seizures get worse or if you have any new types of seizures. Do not stop taking this medicine unless instructed by your doctor or health care professional. Stopping your medicine suddenly can increase your seizures or their severity.  Contact your doctor or health care professional right away if you develop a rash while taking this medicine. Rashes may be very severe and sometimes require treatment in the hospital. Deaths from rashes have occurred. Serious rashes occur more often in children than adults taking this medicine. It is more common for these serious rashes to occur during the first 2 months of treatment, but a rash can occur at any time.  You may get drowsy, dizzy, or have blurred vision. Do not drive, use machinery, or do anything that needs mental alertness until you know how this medicine affects you. To reduce dizzy or fainting spells, do not sit or stand up quickly, especially if you are an older patient. Alcohol can increase drowsiness and dizziness. Avoid alcoholic drinks.  If you are taking this medicine for bipolar disorder, it is important to report any changes in your mood to your doctor or health care professional. If your condition gets worse, you get mentally depressed, feel very hyperactive or manic, have difficulty sleeping, or have thoughts of hurting yourself or committing suicide, you need to get help from your health care professional right away. If you are a caregiver for someone taking this medicine for bipolar disorder, you should also report these behavioral changes right away. The use of this medicine may increase the chance of suicidal thoughts or actions. Pay special attention to how you are responding while on this medicine.  Your mouth may get dry. Chewing  sugarless gum or sucking hard candy, and drinking plenty of water may help. Contact your doctor if the problem does not go away or is severe.  Women who become pregnant while using this medicine may enroll in the [place] American Antiepileptic Drug Pregnancy Registry by calling 1-426.252.8179. This registry collects information about the safety of antiepileptic drug use during pregnancy.  Date Last Reviewed:   NOTE:This sheet is a summary. It may not cover all possible information. If you have questions about this medicine, talk to your doctor, pharmacist, or health care provider. Copyright© 2016 [employer]           [facility]  complies with applicable Federal civil rights laws and does not discriminate on the basis of race, color, national origin, age, disability, or sex.        Language Assistance Services     ATTENTION: Language assistance services are available, free of charge. Please call [phone].      ATENCIÓN: Si habla akilaañol, tiene a vincent disposición servicios gratuitos de asistencia lingüística. Llame al [phone].     CHÚ Ý: N?u b?n nói Ti?ng Vi?t, có các d?ch v? h? tr? ngôn ng? mi?n phí dành cho b?n. G?i s? [phone].

## 2017-04-17 ENCOUNTER — LAB VISIT (OUTPATIENT)
Dept: LAB | Facility: HOSPITAL | Age: 27
End: 2017-04-17
Attending: OBSTETRICS & GYNECOLOGY
Payer: MEDICAID

## 2017-04-17 DIAGNOSIS — O03.9 SAB (SPONTANEOUS ABORTION): ICD-10-CM

## 2017-04-17 DIAGNOSIS — O03.9 SAB (SPONTANEOUS ABORTION): Primary | ICD-10-CM

## 2017-04-17 LAB
BASOPHILS # BLD AUTO: 0.03 K/UL
BASOPHILS NFR BLD: 0.5 %
DIFFERENTIAL METHOD: ABNORMAL
EOSINOPHIL # BLD AUTO: 0.2 K/UL
EOSINOPHIL NFR BLD: 3.8 %
ERYTHROCYTE [DISTWIDTH] IN BLOOD BY AUTOMATED COUNT: 17.8 %
HCT VFR BLD AUTO: 35.9 %
HGB BLD-MCNC: 11.6 G/DL
LYMPHOCYTES # BLD AUTO: 1.6 K/UL
LYMPHOCYTES NFR BLD: 28.1 %
MCH RBC QN AUTO: 25.6 PG
MCHC RBC AUTO-ENTMCNC: 32.3 %
MCV RBC AUTO: 79 FL
MONOCYTES # BLD AUTO: 0.5 K/UL
MONOCYTES NFR BLD: 8.2 %
NEUTROPHILS # BLD AUTO: 3.3 K/UL
NEUTROPHILS NFR BLD: 59.2 %
PLATELET # BLD AUTO: 275 K/UL
PMV BLD AUTO: 11.7 FL
RBC # BLD AUTO: 4.53 M/UL
WBC # BLD AUTO: 5.58 K/UL

## 2017-04-17 PROCEDURE — 85025 COMPLETE CBC W/AUTO DIFF WBC: CPT

## 2017-04-17 PROCEDURE — 36415 COLL VENOUS BLD VENIPUNCTURE: CPT

## 2017-04-17 PROCEDURE — 84702 CHORIONIC GONADOTROPIN TEST: CPT

## 2017-04-18 ENCOUNTER — PATIENT MESSAGE (OUTPATIENT)
Dept: OBSTETRICS AND GYNECOLOGY | Facility: HOSPITAL | Age: 27
End: 2017-04-18

## 2017-04-18 ENCOUNTER — OFFICE VISIT (OUTPATIENT)
Dept: OBSTETRICS AND GYNECOLOGY | Facility: CLINIC | Age: 27
End: 2017-04-18
Payer: MEDICAID

## 2017-04-18 ENCOUNTER — LAB VISIT (OUTPATIENT)
Dept: LAB | Facility: HOSPITAL | Age: 27
End: 2017-04-18
Attending: OBSTETRICS & GYNECOLOGY
Payer: MEDICAID

## 2017-04-18 VITALS
BODY MASS INDEX: 17.07 KG/M2 | WEIGHT: 106.25 LBS | DIASTOLIC BLOOD PRESSURE: 56 MMHG | SYSTOLIC BLOOD PRESSURE: 88 MMHG | HEIGHT: 66 IN

## 2017-04-18 DIAGNOSIS — N93.9 ABNORMAL UTERINE BLEEDING: Primary | ICD-10-CM

## 2017-04-18 DIAGNOSIS — N93.9 ABNORMAL UTERINE BLEEDING: ICD-10-CM

## 2017-04-18 DIAGNOSIS — R11.0 NAUSEA: ICD-10-CM

## 2017-04-18 DIAGNOSIS — R10.2 PELVIC PAIN IN FEMALE: ICD-10-CM

## 2017-04-18 PROBLEM — O03.4 INCOMPLETE ABORTION: Status: RESOLVED | Noted: 2017-01-23 | Resolved: 2017-04-18

## 2017-04-18 LAB
HCG INTACT+B SERPL-ACNC: <1.2 MIU/ML
PROLACTIN SERPL IA-MCNC: 6.7 NG/ML
TSH SERPL DL<=0.005 MIU/L-ACNC: 1.18 UIU/ML

## 2017-04-18 PROCEDURE — 99999 PR PBB SHADOW E&M-EST. PATIENT-LVL III: CPT | Mod: PBBFAC,,, | Performed by: OBSTETRICS & GYNECOLOGY

## 2017-04-18 PROCEDURE — 84146 ASSAY OF PROLACTIN: CPT

## 2017-04-18 PROCEDURE — 84443 ASSAY THYROID STIM HORMONE: CPT

## 2017-04-18 PROCEDURE — 99214 OFFICE O/P EST MOD 30 MIN: CPT | Mod: 24,S$PBB,, | Performed by: OBSTETRICS & GYNECOLOGY

## 2017-04-18 PROCEDURE — 36415 COLL VENOUS BLD VENIPUNCTURE: CPT

## 2017-04-18 RX ORDER — OXYCODONE AND ACETAMINOPHEN 5; 325 MG/1; MG/1
1 TABLET ORAL EVERY 4 HOURS PRN
Qty: 30 TABLET | Refills: 0 | Status: SHIPPED | OUTPATIENT
Start: 2017-04-18 | End: 2017-05-10

## 2017-04-18 RX ORDER — PROMETHAZINE HYDROCHLORIDE 25 MG/1
25 TABLET ORAL EVERY 6 HOURS PRN
Qty: 30 TABLET | Refills: 0 | Status: SHIPPED | OUTPATIENT
Start: 2017-04-18 | End: 2017-05-10

## 2017-04-18 RX ORDER — KETOROLAC TROMETHAMINE 10 MG/1
10 TABLET, FILM COATED ORAL EVERY 6 HOURS PRN
Qty: 20 TABLET | Refills: 0 | Status: SHIPPED | OUTPATIENT
Start: 2017-04-18 | End: 2017-05-18 | Stop reason: SDUPTHER

## 2017-04-18 RX ORDER — NORGESTIMATE AND ETHINYL ESTRADIOL 0.25-0.035
KIT ORAL
Qty: 28 TABLET | Refills: 0 | Status: SHIPPED | OUTPATIENT
Start: 2017-04-18 | End: 2017-05-10

## 2017-04-18 NOTE — PROGRESS NOTES
Subjective:       Patient ID: Noemi Morales is a 27 y.o. female.    Chief Complaint:  Menorrhagia      History of Present Illness  HPI  Presents with heavy bleeding and pelvic pain.  Patient underwent suction D&C with me 17 for blighted ovum that failed medical management with cytotec.  Pathology revealed no pregnancy tissue.  Patient's bleeding stopped x 4 weeks after her D&C, but she is now having frequent, irregular, unpredictable periods.  She started bleeding heavily again yesterday and has had left lower quadrant pelvic pain x 1 day.  Her bleeding is so heavy that she is flooding her clothes in spite of double protection.  Is embarrassed to go out in public.  The pain in her left side is constant x 1 day and is not helped with ibuprofen.  She is also having some nausea.  No new sex partners.  No fever.  Went to ED yesterday for this problem.  Beta hcg was negative and CBC was normal.    GYN & OB History  Patient's last menstrual period was 2017.   Date of Last Pap: 2016    OB History    Para Term  AB SAB TAB Ectopic Multiple Living   3 1 1  2 1 1   1      # Outcome Date GA Lbr Francisco/2nd Weight Sex Delivery Anes PTL Lv   3 TAB            2 Term     M CS-Unspec   Y   1 SAB                   Review of Systems  Review of Systems   Constitutional: Negative for activity change, fatigue, fever and unexpected weight change.   Gastrointestinal: Positive for abdominal pain and nausea. Negative for bloating, constipation, diarrhea and vomiting.   Genitourinary: Positive for menstrual problem, pelvic pain and dysmenorrhea. Negative for dyspareunia, dysuria, frequency, genital sores, hematuria, menorrhagia, urgency, vaginal bleeding, vaginal discharge, vaginal pain, postcoital bleeding and vaginal odor.   Skin:  Negative for rash and hair changes.           Objective:    Physical Exam:   Constitutional: She is oriented to person, place, and time. She appears well-developed and  well-nourished. No distress.             Abdominal: Soft. She exhibits no distension and no mass. There is tenderness (LLQ). There is no rebound and no guarding. Hernia confirmed negative in the right inguinal area and confirmed negative in the left inguinal area.     Genitourinary: Pelvic exam was performed with patient supine. There is no rash, tenderness, lesion or injury on the right labia. There is no rash, tenderness, lesion or injury on the left labia. Uterus is tender. Uterus is not deviated, not enlarged, not fixed and not experiencing uterine prolapse. Right adnexum displays no mass, no tenderness and no fullness. Left adnexum displays tenderness. Left adnexum displays no mass and no fullness. There is bleeding (pooling of menstrual blood in vagina) in the vagina. No erythema, tenderness, rectocele, cystocele or unspecified prolapse of vaginal walls in the vagina. No foreign body in the vagina. No signs of injury around the vagina. No vaginal discharge found. Cervix exhibits no motion tenderness, no discharge and no friability.        Uterus Size: 6 cm       Neurological: She is alert and oriented to person, place, and time.     Psychiatric: She has a normal mood and affect.          Lab Results   Component Value Date    WBC 5.58 04/17/2017    HGB 11.6 (L) 04/17/2017    HCT 35.9 (L) 04/17/2017    MCV 79 (L) 04/17/2017     04/17/2017     Beta hcg yesterday 1.2  Assessment:        1. Abnormal uterine bleeding    2. Pelvic pain in female    3. Nausea                Plan:      . Noemi was seen today for menorrhagia.    Diagnoses and all orders for this visit:    Abnormal uterine bleeding  -     C. trachomatis/N. gonorrhoeae by AMP DNA Cervix  -     US Pelvis Complete Non OB; Future  -     norgestimate-ethinyl estradiol (ORTHO-CYCLEN) 0.25-35 mg-mcg per tablet; Take 1 tab po tid x 3 days, then 1 tab po bid x 3 days, then 1 tab po daily  -     TSH; Future  -     Prolactin; Future    Pelvic pain in  female  -     C. trachomatis/N. gonorrhoeae by AMP DNA Cervix  -     US Pelvis Complete Non OB; Future  -     oxycodone-acetaminophen (PERCOCET) 5-325 mg per tablet; Take 1 tablet by mouth every 4 (four) hours as needed for Pain.  -     ketorolac (TORADOL) 10 mg tablet; Take 1 tablet (10 mg total) by mouth every 6 (six) hours as needed for Pain.    Nausea  -     promethazine (PHENERGAN) 25 MG tablet; Take 1 tablet (25 mg total) by mouth every 6 (six) hours as needed for Nausea.    will treat current bleeding with OCP taper.  RTC 2-3 weeks to review all results, check response to OCP taper, and to determine further plan of care.

## 2017-04-18 NOTE — MR AVS SNAPSHOT
[facility]  [address]  [place] [address]  Phone: [phone]  Fax: [phone]                  Noemi Morales   2017 8:00 AM   Office Visit    Description:  Female : 1990   Provider:  Eneida Garcia MD   [facility]           Reason for Visit     Menorrhagia           Diagnoses this Visit        Comments    Abnormal uterine bleeding    -  Primary     Pelvic pain in female         Nausea                To Do List           Future Appointments        Provider Department Dept Phone    2017 10:15 AM LAB, SAME DAY [facility] [phone]    2017 3:00 PM [facility] -  [phone]    5/10/2017 11:45 AM Eneida Garcia MD [facility]/ -[phone]      Goals (5 Years of Data)     None      Follow-Up and Disposition     Return in about 2 weeks (around 2017) for follow-up bleeding.       These Medications        Disp Refills Start End    norgestimate-ethinyl estradiol (ORTHO-CYCLEN) 0.25-35 mg-mcg per tablet 28 tablet 0 2017     Take 1 tab po tid x 3 days, then 1 tab po bid x 3 days, then 1 tab po daily    Pharmacy: [facility]  [facility] AT [facility] Ph #: [phone]       oxycodone-acetaminophen (PERCOCET) 5-325 mg per tablet 30 tablet 0 2017     Take 1 tablet by mouth every 4 (four) hours as needed for Pain. - Oral    Pharmacy: [facility] [id number] [place], [place] [address] [address] AT [facility] Ph #: [phone]       promethazine (PHENERGAN) 25 MG tablet 30 tablet 0 2017     Take 1 tablet (25 mg total) by mouth every 6 (six) hours as needed for Nausea. - Oral    [facility] [address] [facility] Ph #: [phone]       ketorolac (TORADOL) 10 mg tablet 20 tablet 0 2017    Take 1 tablet  (10 mg total) by mouth every 6 (six) hours as needed for Pain. - Oral    [facility] [id number] [place] [address], [place] - [address] AT [facility] Ph #: [phone]         [facility]     [facility] On Call Nurse Care Line - 24/7 Assistance  Unless otherwise directed by your provider, please contact [facility], our nurse care line that is available for 24/7 assistance.     Registered nurses in the [facility] Call Center provide: appointment scheduling, clinical advisement, health education, and other advisory services.  Call: [phone] (toll free)               Medications           Message regarding Medications     Verify the changes and/or additions to your medication regime listed below are the same as discussed with your clinician today.  If any of these changes or additions are incorrect, please notify your healthcare provider.        START taking these NEW medications        Refills    norgestimate-ethinyl estradiol (ORTHO-CYCLEN) 0.25-35 mg-mcg per tablet 0    Sig: Take 1 tab po tid x 3 days, then 1 tab po bid x 3 days, then 1 tab po daily    Class: Normal    oxycodone-acetaminophen (PERCOCET) 5-325 mg per tablet 0    Sig: Take 1 tablet by mouth every 4 (four) hours as needed for Pain.    Class: Normal    Route: Oral    promethazine (PHENERGAN) 25 MG tablet 0    Sig: Take 1 tablet (25 mg total) by mouth every 6 (six) hours as needed for Nausea.    Class: Normal    Route: Oral    ketorolac (TORADOL) 10 mg tablet 0    Sig: Take 1 tablet (10 mg total) by mouth every 6 (six) hours as needed for Pain.    Class: Normal    Route: Oral           Verify that the below list of medications is an accurate representation of the medications you are currently taking.  If none reported, the list may be blank. If incorrect, please contact your healthcare provider. Carry this list with you in case of emergency.           Current Medications     dextroamphetamine-amphetamine (ADDERALL  "XR) 30 MG 24 hr capsule Take 1 capsule (30 mg total) by mouth every morning.    lamotrigine (LAMICTAL) 200 MG tablet Take 1 tablet (200 mg total) by mouth 2 (two) times daily.    ketorolac (TORADOL) 10 mg tablet Take 1 tablet (10 mg total) by mouth every 6 (six) hours as needed for Pain.    norgestimate-ethinyl estradiol (ORTHO-CYCLEN) 0.25-35 mg-mcg per tablet Take 1 tab po tid x 3 days, then 1 tab po bid x 3 days, then 1 tab po daily    oxycodone-acetaminophen (PERCOCET) 5-325 mg per tablet Take 1 tablet by mouth every 4 (four) hours as needed for Pain.    promethazine (PHENERGAN) 25 MG tablet Take 1 tablet (25 mg total) by mouth every 6 (six) hours as needed for Nausea.           Clinical Reference Information           Your Vitals Were     BP Height Weight Last Period BMI    88/56 5' 6" (1.676 m) 48.2 kg (106 lb 4.2 oz) 04/16/2017 17.15 kg/m2      Blood Pressure          Most Recent Value    BP  (!)  88/56      Allergies as of 4/18/2017     Sulfa (Sulfonamide Antibiotics)      Immunizations Administered on Date of Encounter - 4/18/2017     None      Orders Placed During Today's Visit      Normal Orders This Visit    C. trachomatis/N. gonorrhoeae by AMP DNA Cervix     Future Labs/Procedures Expected by Expires    Prolactin  4/18/2017 6/17/2018    TSH  4/18/2017 6/17/2018    US Pelvis Complete Non OB  4/18/2017 4/18/2018      Smoking Cessation     If you would like to quit smoking:   You may be eligible for free services if you are a [place] resident and started smoking cigarettes before September 1, 1988.  Call the Smoking Cessation [employer] (SCT) toll free at (328) 133-3739 or ([phone] 155-9067.   Call 6-111-QUIT-NOW if you do not meet the above criteria.   Contact us via email: [email or web address]   View our website for more information: [email or web address]        Language Assistance Services     ATTENTION: Language assistance services are available, free of charge. Please call [phone].  "     ATENCIÓN: Si habla español, tiene a vincent disposición servicios gratuitos de asistencia lingüística. Jose al [phone].     CHÚ Ý: N?u b?n nói Ti?ng Vi?t, có các d?ch v? h? tr? ngôn ng? mi?n phí dành cho b?n. G?i s? [phone].         [facility] complies with applicable Federal civil rights laws and does not discriminate on the basis of race, color, national origin, age, disability, or sex.

## 2017-04-19 ENCOUNTER — HOSPITAL ENCOUNTER (OUTPATIENT)
Dept: RADIOLOGY | Facility: HOSPITAL | Age: 27
Discharge: HOME | End: 2017-04-19
Attending: OBSTETRICS & GYNECOLOGY
Payer: MEDICAID

## 2017-04-19 DIAGNOSIS — R10.2 PELVIC PAIN IN FEMALE: ICD-10-CM

## 2017-04-19 DIAGNOSIS — N93.9 ABNORMAL UTERINE BLEEDING: ICD-10-CM

## 2017-04-19 LAB
C TRACH DNA SPEC QL NAA+PROBE: NOT DETECTED
N GONORRHOEA DNA SPEC QL NAA+PROBE: NOT DETECTED

## 2017-04-19 PROCEDURE — 76856 US EXAM PELVIC COMPLETE: CPT | Mod: TC

## 2017-04-23 ENCOUNTER — HOSPITAL ENCOUNTER (EMERGENCY)
Facility: HOSPITAL | Age: 27
Discharge: HOME | End: 2017-04-23
Attending: EMERGENCY MEDICINE
Payer: MEDICAID

## 2017-04-23 VITALS
HEART RATE: 100 BPM | BODY MASS INDEX: 17.68 KG/M2 | WEIGHT: 110 LBS | SYSTOLIC BLOOD PRESSURE: 121 MMHG | OXYGEN SATURATION: 96 % | TEMPERATURE: 99 F | RESPIRATION RATE: 16 BRPM | DIASTOLIC BLOOD PRESSURE: 65 MMHG | HEIGHT: 66 IN

## 2017-04-23 DIAGNOSIS — Z76.0 MEDICATION REFILL: Primary | ICD-10-CM

## 2017-04-23 PROCEDURE — 99282 EMERGENCY DEPT VISIT SF MDM: CPT

## 2017-04-23 RX ORDER — DEXTROAMPHETAMINE SACCHARATE, AMPHETAMINE ASPARTATE MONOHYDRATE, DEXTROAMPHETAMINE SULFATE AND AMPHETAMINE SULFATE 7.5; 7.5; 7.5; 7.5 MG/1; MG/1; MG/1; MG/1
30 CAPSULE, EXTENDED RELEASE ORAL EVERY MORNING
Qty: 3 CAPSULE | Refills: 0 | Status: SHIPPED | OUTPATIENT
Start: 2017-04-23 | End: 2017-08-22

## 2017-04-23 NOTE — ED AVS SNAPSHOT
[facility]  [address]  [facility] [address]               Noemi Morales   2017  9:53 AM   ED    Description:  Female : 1990   Department:  [facility]           Your Care was Coordinated By:     Provider Role From To    Jm Aldridge NP Nurse Practitioner 17 0953 --      Reason for Visit     Medication Refill           Diagnoses this Visit        Comments    Medication refill    -  Primary       ED Disposition     None           To Do List           Follow-up Information     Schedule an appointment as soon as possible for a visit with Jace Yadav MD.    Specialty:  Internal Medicine    Why:  As needed    Contact information:    [address]  [facility] [id number] [phone]         These Medications        Disp Refills Start End    dextroamphetamine-amphetamine (ADDERALL XR) 30 MG 24 hr capsule 3 capsule 0 2017     Take 1 capsule (30 mg total) by mouth every morning. - Oral    Pharmacy: [facility] [id number] [place] [address] [address] AT [facility] Ph #: [phone]         [facility]     [facility] Nurse Care Line -  Assistance  Unless otherwise directed by your provider, please contact [facility], our nurse care line that is available for  assistance.     Registered nurses in the [facility] provide: appointment scheduling, clinical advisement, health education, and other advisory services.  Call: [phone] (toll free)               Medications           Message regarding Medications     Verify the changes and/or additions to your medication regime listed below are the same as discussed with your clinician today.  If any of these changes or additions are incorrect, please notify your healthcare provider.        START taking these NEW medications        Refills    dextroamphetamine-amphetamine (ADDERALL XR) 30 MG  "24 hr capsule 0    Sig: Take 1 capsule (30 mg total) by mouth every morning.    Class: Print    Route: Oral           Verify that the below list of medications is an accurate representation of the medications you are currently taking.  If none reported, the list may be blank. If incorrect, please contact your healthcare provider. Carry this list with you in case of emergency.           Current Medications     dextroamphetamine-amphetamine (ADDERALL XR) 30 MG 24 hr capsule Take 1 capsule (30 mg total) by mouth every morning.    ketorolac (TORADOL) 10 mg tablet Take 1 tablet (10 mg total) by mouth every 6 (six) hours as needed for Pain.    lamotrigine (LAMICTAL) 200 MG tablet Take 1 tablet (200 mg total) by mouth 2 (two) times daily.    norgestimate-ethinyl estradiol (ORTHO-CYCLEN) 0.25-35 mg-mcg per tablet Take 1 tab po tid x 3 days, then 1 tab po bid x 3 days, then 1 tab po daily    oxycodone-acetaminophen (PERCOCET) 5-325 mg per tablet Take 1 tablet by mouth every 4 (four) hours as needed for Pain.    promethazine (PHENERGAN) 25 MG tablet Take 1 tablet (25 mg total) by mouth every 6 (six) hours as needed for Nausea.           Clinical Reference Information           Your Vitals Were     BP Pulse Temp Resp Height Weight    121/65 (BP Location: Right arm, Patient Position: Sitting) 100 98.5 °F (36.9 °C) (Oral) 16 5' 6" (1.676 m) 49.9 kg (110 lb)    Last Period SpO2 BMI          04/16/2017 96% 17.75 kg/m2        Allergies as of 4/23/2017        Reactions    Sulfa (Sulfonamide Antibiotics) Rash      Immunizations Administered on Date of Encounter - 4/23/2017     None      ED Micro, Lab, POCT     None      ED Imaging Orders     None      Discharge References/Attachments     ADHD, TREATING: LEARNING MORE (ENGLISH)    ADHD, WHAT IS (ENGLISH)      Your Scheduled Appointments     May 10, 2017 11:45 AM CDT   Established Patient Visit with MD Pearl Orozco - OB/ GYN ([facility]  [place] [id number] [phone]              Smoking Cessation     If you would like to quit smoking:   You may be eligible for free services if you are a [place] resident and started smoking cigarettes before September 1, 1988.  Call the Smoking Cessation Trust (SCT) toll free at (756) 165-2419 or (258) 695-6047.   Call 1-800-QUIT-NOW if you do not meet the above criteria.   Contact us via email: [email or web address]   View our website for more information: [email or web address]         [facility] complies with applicable Federal civil rights laws and does not discriminate on the basis of race, color, national origin, age, disability, or sex.        Language Assistance Services     ATTENTION: Language assistance services are available, free of charge. Please call [phone].      ATENCIÓN: Si habla español, tiene a vincent disposición servicios gratuitos de asistencia lingüística. Llame al [phone].     CHÚ Ý: N?u b?n nói Ti?ng Vi?t, có các d?ch v? h? tr? ngôn ng? mi?n phí dành cho b?n. G?i s? [phone].

## 2017-04-23 NOTE — ED PROVIDER NOTES
Encounter Date: 2017       History     Chief Complaint   Patient presents with    Medication Refill     needs refill of adhd medication     Review of patient's allergies indicates:   Allergen Reactions    Sulfa (sulfonamide antibiotics) Rash     HPI Comments: 27 year old female here for refill of Adderall.  Pt ran out of Adderall.      Past Medical History:   Diagnosis Date    ADHD (attention deficit hyperactivity disorder)     Bipolar 2 disorder     Scoliosis      Past Surgical History:   Procedure Laterality Date     SECTION  10/20/2015    x 1    DILATION AND CURETTAGE OF UTERUS      WISDOM TOOTH EXTRACTION       Family History   Problem Relation Age of Onset    No Known Problems Mother     Breast cancer Neg Hx     Colon cancer Neg Hx     Ovarian cancer Neg Hx      Social History   Substance Use Topics    Smoking status: Current Every Day Smoker     Packs/day: 0.25    Smokeless tobacco: Never Used      Comment: no smoking after m.n prior to surgery    Alcohol use 0.0 oz/week     0 Standard drinks or equivalent per week      Comment: socially  No alcohol prior to surgery     Review of Systems   Constitutional: Negative for fever.   HENT: Negative for sore throat.    Respiratory: Negative for shortness of breath.    Cardiovascular: Negative for chest pain.   Gastrointestinal: Negative for nausea.   Genitourinary: Negative for dysuria.   Musculoskeletal: Negative for back pain.   Skin: Negative for rash.   Neurological: Negative for weakness.   Hematological: Does not bruise/bleed easily.       Physical Exam   Initial Vitals   BP Pulse Resp Temp SpO2   17 0929 17 0929 17 0929 17 0929 17 0929   121/65 100 16 98.5 °F (36.9 °C) 96 %     Physical Exam    Nursing note and vitals reviewed.  Constitutional: She appears well-developed and well-nourished.   HENT:   Head: Normocephalic and atraumatic.   Eyes: Conjunctivae and EOM are normal. Pupils are equal, round, and  reactive to light.   Neck: Normal range of motion. Neck supple.   Cardiovascular: Normal rate, regular rhythm, normal heart sounds and intact distal pulses.   Pulmonary/Chest: Breath sounds normal.   Abdominal: Soft. There is no tenderness. There is no rebound and no guarding.   Musculoskeletal: Normal range of motion.   Neurological: She is alert and oriented to person, place, and time. She has normal strength and normal reflexes.   Skin: Skin is warm and dry.   Psychiatric: She has a normal mood and affect. Her behavior is normal. Thought content normal.         ED Course   Procedures  Labs Reviewed - No data to display                            ED Course     Clinical Impression:   The encounter diagnosis was Medication refill.          Jm Aldridge NP  04/23/17 1001       Jm Aldridge NP  04/23/17 1005

## 2017-05-10 ENCOUNTER — OFFICE VISIT (OUTPATIENT)
Dept: OBSTETRICS AND GYNECOLOGY | Facility: CLINIC | Age: 27
End: 2017-05-10
Payer: MEDICAID

## 2017-05-10 VITALS
SYSTOLIC BLOOD PRESSURE: 90 MMHG | HEIGHT: 66 IN | WEIGHT: 102.06 LBS | DIASTOLIC BLOOD PRESSURE: 60 MMHG | BODY MASS INDEX: 16.4 KG/M2

## 2017-05-10 DIAGNOSIS — D25.0 SUBMUCOUS LEIOMYOMA OF UTERUS: ICD-10-CM

## 2017-05-10 DIAGNOSIS — N93.9 ABNORMAL UTERINE BLEEDING: Primary | ICD-10-CM

## 2017-05-10 PROCEDURE — 99212 OFFICE O/P EST SF 10 MIN: CPT | Mod: S$PBB,,, | Performed by: OBSTETRICS & GYNECOLOGY

## 2017-05-10 PROCEDURE — 99212 OFFICE O/P EST SF 10 MIN: CPT | Mod: PBBFAC | Performed by: OBSTETRICS & GYNECOLOGY

## 2017-05-10 PROCEDURE — 99999 PR PBB SHADOW E&M-EST. PATIENT-LVL II: CPT | Mod: PBBFAC,,, | Performed by: OBSTETRICS & GYNECOLOGY

## 2017-05-10 NOTE — PROGRESS NOTES
"  Subjective:       Patient ID: Noemi Morales is a 27 y.o. female.    Chief Complaint:  Follow-up      History of Present Illness  HPI  Presents to follow-up abnormal uterine bleeding and pelvic pain.  Patient saw me 17 with heavy vaginal bleeding and severe LLQ pelvic pain.  My last note was reviewed in detail.  TSH and prolactin were normal.  Recent CBC unremarkable.  Patient was given an OCP taper and pelvic ultrasound was ordered.  Pelvic ultrasound shows:  "1.Endometrial stripe is normal in thickness.  There is a small amount of fluid within the endometrial canal which could relate to blood products.  There is no abnormal vascular perfusion in this region to suggest retained products of conception.    2.  Hypoechoic focus involving the anterior aspect of the lower uterine segment may relate to a small leiomyoma or prominent  scar."    Patient completed the OCP taper, and her bleeding stopped after 6 days.  She is not bleeding today, but has had several episodes of heavy, irregular bleeding over the last year.  Can't take OCP's long-term b/c they interact with her lamictal.    She also passed a kidney stone soon after her last visit with me, and her LLQ pelvic pain completely resolved.    GYN & OB History  Patient's last menstrual period was 2017.   Date of Last Pap: 2016    OB History    Para Term  AB SAB TAB Ectopic Multiple Living   3 1 1  2 1 1   1      # Outcome Date GA Lbr Francisco/2nd Weight Sex Delivery Anes PTL Lv   3 TAB            2 Term     M CS-Unspec   Y   1 SAB                   Review of Systems  Review of Systems   Constitutional: Negative for fatigue, fever and unexpected weight change.   Gastrointestinal: Negative for abdominal pain, constipation, diarrhea, nausea and vomiting.   Genitourinary: Positive for menorrhagia and menstrual problem. Negative for dyspareunia, dysuria, frequency, pelvic pain, urgency, vaginal bleeding, vaginal discharge, " vaginal pain, dysmenorrhea, postcoital bleeding and vaginal odor.           Objective:    Physical Exam:   Constitutional: She is oriented to person, place, and time. She appears well-developed and well-nourished. No distress.                           Neurological: She is alert and oriented to person, place, and time.    Skin: No rash noted.    Psychiatric: She has a normal mood and affect. Her behavior is normal. Judgment and thought content normal.          Assessment:        1. Abnormal uterine bleeding    2. Submucous leiomyoma of uterus                Plan:      Noemi was seen today for follow-up.    Diagnoses and all orders for this visit:    Abnormal uterine bleeding  -     US OB/GYN Procedure ([facility])-Future; Future    Submucous leiomyoma of uterus  -     US OB/GYN Procedure ([facility])-Future; Future    I personally reviewed the ultrasound images.  Area in the lower uterine segment is suspicious for possible leiomyoma with submucosal component.  This may have been what we were seeing when she had prolonged bleeding following a miscarriage with no pregnancy tissue obtained from suction D&C.  Discussed expectant management versus medical management of AUB with OCP's/depo provera versus surgical exploration and possible removal of possible fibroid.  Patient states she has been dealing with bleeding issues for awhile, and is ready to proceed with surgery.  Will schedule sonohysterogram to further work this area up, then will plan appropriate surgical management based on what we find.

## 2017-05-17 ENCOUNTER — TELEPHONE (OUTPATIENT)
Dept: OBSTETRICS AND GYNECOLOGY | Facility: CLINIC | Age: 27
End: 2017-05-17

## 2017-05-17 DIAGNOSIS — R11.0 NAUSEA: ICD-10-CM

## 2017-05-17 DIAGNOSIS — N93.9 ABNORMAL UTERINE BLEEDING: ICD-10-CM

## 2017-05-17 DIAGNOSIS — R10.2 PELVIC PAIN IN FEMALE: ICD-10-CM

## 2017-05-17 NOTE — TELEPHONE ENCOUNTER
Pt came into the clinic today to request a refill on Toradol, phenergan and the birth control pills that she was prescribed to help stop the bleeding. Pt states she does not know the name of it. Please advise.

## 2017-05-17 NOTE — TELEPHONE ENCOUNTER
"----- Message from Mena Allen MA sent at 5/17/2017  1:56 PM CDT -----  Pt desires a medication "does not know what meds" that Dr. Garcia prescribed to her to stop the bleeding until she sees her in June.   "

## 2017-05-18 ENCOUNTER — TELEPHONE (OUTPATIENT)
Dept: OBSTETRICS AND GYNECOLOGY | Facility: CLINIC | Age: 27
End: 2017-05-18

## 2017-05-18 RX ORDER — PROMETHAZINE HYDROCHLORIDE 25 MG/1
25 TABLET ORAL EVERY 6 HOURS PRN
Qty: 30 TABLET | Refills: 0 | Status: SHIPPED | OUTPATIENT
Start: 2017-05-18 | End: 2017-08-22 | Stop reason: CLARIF

## 2017-05-18 RX ORDER — NORGESTIMATE AND ETHINYL ESTRADIOL 0.25-0.035
1 KIT ORAL DAILY
Qty: 28 TABLET | Refills: 3 | Status: SHIPPED | OUTPATIENT
Start: 2017-05-18 | End: 2017-08-22 | Stop reason: CLARIF

## 2017-05-18 RX ORDER — KETOROLAC TROMETHAMINE 10 MG/1
10 TABLET, FILM COATED ORAL EVERY 6 HOURS PRN
Qty: 20 TABLET | Refills: 0 | Status: SHIPPED | OUTPATIENT
Start: 2017-05-18 | End: 2017-05-23

## 2017-05-18 NOTE — TELEPHONE ENCOUNTER
----- Message from Jane Bryant sent at 5/18/2017  3:11 PM CDT -----  Contact: Patient  Patient needs to come in for heavy bleeding and feel that she needs another D&C, Please  call her at [phone].    Thanks  td

## 2017-05-18 NOTE — TELEPHONE ENCOUNTER
----- Message from Laura Reid sent at 5/18/2017 10:15 AM CDT -----  Contact: [facility]  PLEASE call pharmacy [phone], regarding script for birth control, need to clarify direction.

## 2017-05-18 NOTE — TELEPHONE ENCOUNTER
I just sent another Rx for birth control.  Please advise her to take them as a taper instead of one per day.  Advise her to take 1 po tid x 3 days, then 1 po bid x 3 days, then one per day.  We need to move up her appointment for the sonohysterogram to some time next week.

## 2017-05-18 NOTE — TELEPHONE ENCOUNTER
----- Message from Abundioradha Cordero sent at 5/18/2017 11:40 AM CDT -----  Contact: [phone]   Pt would like refills called on pain med's, pt states she is still in a lot of pain.  Please call back @ [phone].  Thanks-[facility]       Pt uses:    [facility] [place] - [address] AT [facility] [address]  [address] [address]  Phone: [phone] Fax: [phone]

## 2017-05-22 ENCOUNTER — PATIENT MESSAGE (OUTPATIENT)
Dept: OBSTETRICS AND GYNECOLOGY | Facility: CLINIC | Age: 27
End: 2017-05-22

## 2017-06-13 ENCOUNTER — TELEPHONE (OUTPATIENT)
Dept: OBSTETRICS AND GYNECOLOGY | Facility: CLINIC | Age: 27
End: 2017-06-13

## 2017-06-13 NOTE — TELEPHONE ENCOUNTER
----- Message from Khushboo Mittal sent at 6/13/2017  8:04 AM CDT -----  Contact: Pt  Pt is returning the nurse call regarding rescheduling her ultrasound and procedure. Pls call pt back at [phone].

## 2017-06-13 NOTE — TELEPHONE ENCOUNTER
If she feels she can wait until July, then I can do it.  If not, I'll have to discuss her case with my partners and see who would be willing to do the sonohysterogram.

## 2017-06-30 ENCOUNTER — HOSPITAL ENCOUNTER (EMERGENCY)
Facility: HOSPITAL | Age: 27
Discharge: HOME | End: 2017-06-30
Attending: EMERGENCY MEDICINE
Payer: MEDICAID

## 2017-06-30 ENCOUNTER — TELEPHONE (OUTPATIENT)
Dept: OBSTETRICS AND GYNECOLOGY | Facility: CLINIC | Age: 27
End: 2017-06-30

## 2017-06-30 VITALS
BODY MASS INDEX: 16.88 KG/M2 | DIASTOLIC BLOOD PRESSURE: 77 MMHG | TEMPERATURE: 98 F | RESPIRATION RATE: 18 BRPM | HEART RATE: 104 BPM | OXYGEN SATURATION: 99 % | WEIGHT: 105 LBS | HEIGHT: 66 IN | SYSTOLIC BLOOD PRESSURE: 101 MMHG

## 2017-06-30 DIAGNOSIS — R30.0 DYSURIA: Primary | ICD-10-CM

## 2017-06-30 LAB
B-HCG UR QL: NEGATIVE
BILIRUB UR QL STRIP: NEGATIVE
CLARITY UR: CLEAR
COLOR UR: YELLOW
GLUCOSE UR QL STRIP: NEGATIVE
HGB UR QL STRIP: NEGATIVE
KETONES UR QL STRIP: NEGATIVE
LEUKOCYTE ESTERASE UR QL STRIP: NEGATIVE
NITRITE UR QL STRIP: NEGATIVE
PH UR STRIP: 7 [PH] (ref 5–8)
PROT UR QL STRIP: NEGATIVE
SP GR UR STRIP: <=1.005 (ref 1–1.03)
URN SPEC COLLECT METH UR: ABNORMAL
UROBILINOGEN UR STRIP-ACNC: NEGATIVE EU/DL

## 2017-06-30 PROCEDURE — 81003 URINALYSIS AUTO W/O SCOPE: CPT

## 2017-06-30 PROCEDURE — 81025 URINE PREGNANCY TEST: CPT

## 2017-06-30 PROCEDURE — 99283 EMERGENCY DEPT VISIT LOW MDM: CPT

## 2017-06-30 RX ORDER — NITROFURANTOIN 25; 75 MG/1; MG/1
100 CAPSULE ORAL 2 TIMES DAILY
Qty: 10 CAPSULE | Refills: 0 | Status: SHIPPED | OUTPATIENT
Start: 2017-06-30 | End: 2017-07-05

## 2017-06-30 RX ORDER — PHENAZOPYRIDINE HYDROCHLORIDE 200 MG/1
200 TABLET, FILM COATED ORAL 3 TIMES DAILY
Qty: 6 TABLET | Refills: 0 | Status: SHIPPED | OUTPATIENT
Start: 2017-06-30 | End: 2017-07-10

## 2017-06-30 NOTE — TELEPHONE ENCOUNTER
----- Message from Elena Harrison sent at 6/30/2017  9:30 AM CDT -----  Contact: pt  The pt states she has a vaginal infection and wants to be worked in today, pt can be reached at [phone]///thxMW

## 2017-06-30 NOTE — ED PROVIDER NOTES
SCRIBE #1 NOTE: I, Kishan Pedersen, am scribing for, and in the presence of, Jm Aldridge NP. I have scribed the entire note.      History      Chief Complaint   Patient presents with    Urinary Tract Infection     lower abd pain, burning/ painful urination, bleeding between cycles        Review of patient's allergies indicates:   Allergen Reactions    Sulfa (sulfonamide antibiotics) Rash        HPI   HPI    2017, 1:08 PM   History obtained from the patient      History of Present Illness: Noemi Morales is a 27 y.o. female patient who presents to the Emergency Department for an evaluation of lower abdominal pain which onset gradually x1 month ago. Pt states she has been evaluated at a urgent care facility where she was prescribed abx. Pt denies any relief which prompted her to come to this ED for further evaluation.  Symptoms are constant and moderate in severity. Exacerbated by nothing and relieved by nothing. Associated sxs include dysuria for over x1 month and vaginal bleeding between cycles. Patient denies any fever, chills, HA, lightheadedness, vaginal discharge, pelvic pain, decreased urination, difficulty urinating, N/V/D, and all other sxs at this time. No further complaints or concerns at this time.     Arrival mode: Personal vehicle    PCP: Jace Yadav MD       Past Medical History:  Past Medical History:   Diagnosis Date    ADHD (attention deficit hyperactivity disorder)     Bipolar 2 disorder     Scoliosis        Past Surgical History:  Past Surgical History:   Procedure Laterality Date     SECTION  10/20/2015    x 1    DILATION AND CURETTAGE OF UTERUS      WISDOM TOOTH EXTRACTION           Family History:  Family History   Problem Relation Age of Onset    No Known Problems Mother     Breast cancer Neg Hx     Colon cancer Neg Hx     Ovarian cancer Neg Hx        Social History:  Social History     Social History Main Topics    Smoking status: Current Every Day  Smoker     Packs/day: 0.25    Smokeless tobacco: Never Used      Comment: no smoking after m.n prior to surgery    Alcohol use 0.0 oz/week      Comment: socially  No alcohol prior to surgery    Drug use: No    Sexual activity: Not Currently     Partners: Male       ROS   Review of Systems   Constitutional: Negative for chills, diaphoresis and fever.   HENT: Negative for sore throat.    Respiratory: Negative for chest tightness and shortness of breath.    Cardiovascular: Negative for chest pain.   Gastrointestinal: Positive for abdominal pain. Negative for diarrhea, nausea and vomiting.   Genitourinary: Positive for dysuria and vaginal bleeding. Negative for decreased urine volume, difficulty urinating, hematuria, pelvic pain and vaginal discharge.   Musculoskeletal: Negative for back pain, neck pain and neck stiffness.   Skin: Negative for rash.   Neurological: Negative for dizziness, weakness, light-headedness and headaches.   Hematological: Does not bruise/bleed easily.     Physical Exam      Initial Vitals [06/30/17 1259]   BP Pulse Resp Temp SpO2   101/77 104 18 98 °F (36.7 °C) 99 %      MAP       85          Physical Exam  Nursing Notes and Vital Signs Reviewed.  Constitutional: Patient is in no acute distress. Well-developed and well-nourished.  Head: Atraumatic. Normocephalic.  Eyes: PERRL. EOM intact. Conjunctivae are not pale. No scleral icterus.  ENT: Mucous membranes are moist. Oropharynx is clear and symmetric.    Neck: Supple. Full ROM. No lymphadenopathy.  Cardiovascular: Regular rate. Regular rhythm.  Pulmonary/Chest: No respiratory distress. Clear to auscultation bilaterally.   Abdominal: Soft and non-distended.  There is no tenderness.  Musculoskeletal: Moves all extremities. No obvious deformities.  Skin: Warm and dry.  Neurological:  Alert, awake, and appropriate.  Normal speech.  No acute focal neurological deficits are appreciated.  Psychiatric: Normal affect. Good eye contact. Appropriate  "in content.    ED Course    Procedures  ED Vital Signs:  Vitals:    06/30/17 1259   BP: 101/77   Pulse: 104   Resp: 18   Temp: 98 °F (36.7 °C)   TempSrc: Oral   SpO2: 99%   Weight: 47.6 kg (105 lb)   Height: 5' 6" (1.676 m)          Results for orders placed or performed during the hospital encounter of 06/30/17   Urinalysis   Result Value Ref Range    Specimen UA Urine, Clean Catch     Color, UA Yellow Yellow, Straw, Amaris    Appearance, UA Clear Clear    pH, UA 7.0 5.0 - 8.0    Specific Gravity, UA <=1.005 (A) 1.005 - 1.030    Protein, UA Negative Negative    Glucose, UA Negative Negative    Ketones, UA Negative Negative    Bilirubin (UA) Negative Negative    Occult Blood UA Negative Negative    Nitrite, UA Negative Negative    Urobilinogen, UA Negative <2.0 EU/dL    Leukocytes, UA Negative Negative   Rapid Pregnancy, Urine   Result Value Ref Range    Preg Test, Ur Negative          The Emergency Provider reviewed the vital signs and test results, which are outlined above.    ED Discussion     1:49 PM: Reassessed pt at this time. Pt is awake, alert, and in NAD. Discussed with pt all pertinent ED information and results. Discussed pt dx of dysuria and plan of tx. Gave pt all f/u and return to the ED instructions. All questions and concerns were addressed at this time. Pt expresses understanding of information and instructions, and is comfortable with plan to discharge. Pt is stable for discharge.    I discussed with patient and/or family/caretaker that evaluation in the ED does not suggest any emergent or life threatening medical conditions requiring immediate intervention beyond what was provided in the ED, and I believe patient is safe for discharge.  Regardless, an unremarkable evaluation in the ED does not preclude the development or presence of a serious of life threatening condition. As such, patient was instructed to return immediately for any worsening or change in current symptoms.      ED " Medication(s):  Medications - No data to display    New Prescriptions    NITROFURANTOIN, MACROCRYSTAL-MONOHYDRATE, (MACROBID) 100 MG CAPSULE    Take 1 capsule (100 mg total) by mouth 2 (two) times daily.    PHENAZOPYRIDINE (PYRIDIUM) 200 MG TABLET    Take 1 tablet (200 mg total) by mouth 3 (three) times daily.       Follow-up Information     [facility]. Schedule an appointment as soon as possible for a visit in 2 days.    Contact information:  [phone]                   Medical Decision Making    Medical Decision Making:   Clinical Tests:   Lab Tests: Ordered and Reviewed           Scribe Attestation:   Scribe #1: I performed the above scribed service and the documentation accurately describes the services I performed. I attest to the accuracy of the note.    Attending:   Physician Attestation Statement for Scribe #1: I, Jm Aldridge NP, personally performed the services described in this documentation, as scribed by Kishan Pedersen, in my presence, and it is both accurate and complete.          Clinical Impression       ICD-10-CM ICD-9-CM   1. Dysuria R30.0 788.1       Disposition:   Disposition: Discharged  Condition: Stable         Jm Aldridge NP  06/30/17 7005

## 2017-08-22 ENCOUNTER — HOSPITAL ENCOUNTER (EMERGENCY)
Facility: HOSPITAL | Age: 27
Discharge: HOME | End: 2017-08-22
Payer: MEDICAID

## 2017-08-22 VITALS
OXYGEN SATURATION: 99 % | BODY MASS INDEX: 17.68 KG/M2 | WEIGHT: 110 LBS | HEIGHT: 66 IN | DIASTOLIC BLOOD PRESSURE: 65 MMHG | HEART RATE: 84 BPM | TEMPERATURE: 99 F | RESPIRATION RATE: 19 BRPM | SYSTOLIC BLOOD PRESSURE: 105 MMHG

## 2017-08-22 DIAGNOSIS — Z76.0 MEDICATION REFILL: Primary | ICD-10-CM

## 2017-08-22 DIAGNOSIS — Z86.59 HISTORY OF BIPOLAR DISORDER: ICD-10-CM

## 2017-08-22 DIAGNOSIS — Z86.59 HISTORY OF ADHD: ICD-10-CM

## 2017-08-22 PROCEDURE — 99282 EMERGENCY DEPT VISIT SF MDM: CPT

## 2017-08-22 RX ORDER — DEXTROAMPHETAMINE SACCHARATE, AMPHETAMINE ASPARTATE MONOHYDRATE, DEXTROAMPHETAMINE SULFATE AND AMPHETAMINE SULFATE 7.5; 7.5; 7.5; 7.5 MG/1; MG/1; MG/1; MG/1
30 CAPSULE, EXTENDED RELEASE ORAL EVERY MORNING
Qty: 7 CAPSULE | Refills: 0 | Status: SHIPPED | OUTPATIENT
Start: 2017-08-22 | End: 2018-01-10

## 2017-08-22 RX ORDER — LAMOTRIGINE 200 MG/1
200 TABLET ORAL 2 TIMES DAILY
Qty: 14 TABLET | Refills: 2 | Status: SHIPPED | OUTPATIENT
Start: 2017-08-22

## 2017-08-23 NOTE — ED PROVIDER NOTES
SCRIBE #1 NOTE: I, Kishan Pedersen, am scribing for, and in the presence of, KAYLA Strong. I have scribed the entire note.      History      Chief Complaint   Patient presents with    Medication Refill     reports needing med rill for lamictal 300mg BID.       Review of patient's allergies indicates:   Allergen Reactions    Sulfa (sulfonamide antibiotics) Rash        HPI   HPI    2017, 9:33 PM   History obtained from the patient      History of Present Illness: Noemi Morales is a 27 y.o. female patient who presents to the Emergency Department for a medication refill. Pt states she needs to have her Lamictal and Adderall 30mg refilled until she can see her psychiatrist in x1 week.  Patient states that she has been traveling out of state recently due to custody garza and that she hasn't been able to schedule appointment with psychiatrist due to traveling.  Patient reports she has been out of Lamictal and Adderall since yesterday and that just needs refills for seven days when she has an appointment with Dr. Stewart. Pt denies any falls, head trauma, LOC, tongue biting, drooling, dizziness, confusion, urinary or fecal incontinence and all other sx.    Arrival mode: Personal vehicle    PCP: Jace Yadav MD       Past Medical History:  Past Medical History:   Diagnosis Date    ADHD (attention deficit hyperactivity disorder)     Bipolar 2 disorder     Scoliosis        Past Surgical History:  Past Surgical History:   Procedure Laterality Date     SECTION  10/20/2015    x 1    DILATION AND CURETTAGE OF UTERUS      WISDOM TOOTH EXTRACTION           Family History:  Family History   Problem Relation Age of Onset    No Known Problems Mother     Breast cancer Neg Hx     Colon cancer Neg Hx     Ovarian cancer Neg Hx        Social History:  Social History     Social History Main Topics    Smoking status: Current Every Day Smoker     Packs/day: 0.25    Smokeless tobacco: Never Used       Comment: no smoking after m.n prior to surgery    Alcohol use 0.0 oz/week      Comment: socially  No alcohol prior to surgery    Drug use: No    Sexual activity: Not Currently     Partners: Male       ROS   Review of Systems   Constitutional: Negative for chills and fever.   HENT: Negative for congestion, drooling and sore throat.         (-) Tongue biting   Respiratory: Negative for cough and shortness of breath.    Cardiovascular: Negative for chest pain.   Gastrointestinal: Negative for abdominal pain, nausea and vomiting.   Genitourinary: Negative for dysuria and hematuria.        (-) Bladder/bowel incontinence   Musculoskeletal: Negative for back pain and neck pain.   Skin: Negative for rash.   Neurological: Negative for dizziness, seizures and weakness.   Hematological: Does not bruise/bleed easily.   Psychiatric/Behavioral: Negative for confusion.     Physical Exam      Initial Vitals [08/22/17 2129]   BP Pulse Resp Temp SpO2   105/65 84 19 98.5 °F (36.9 °C) 99 %      MAP       78.33          Physical Exam  Nursing Notes and Vital Signs Reviewed.  Constitutional: Patient is in no acute distress. Well-developed and well-nourished.  Head: Atraumatic. Normocephalic.  Eyes: PERRL. EOM intact. Conjunctivae are not pale. No scleral icterus.  ENT: Mucous membranes are moist.   Neck: Supple. Full ROM. No lymphadenopathy.  Cardiovascular: Regular rate. Regular rhythm.  Pulmonary/Chest: No respiratory distress.   Abdominal: Soft and non-distended.  There is no tenderness.   Musculoskeletal: Moves all extremities. No obvious deformities.  Skin: Warm and dry.  Neurological:  Alert, awake, and appropriate.  Normal speech.  No acute focal neurological deficits are appreciated.  Psychiatric: Normal affect. Good eye contact. Appropriate in content.    ED Course    Procedures  ED Vital Signs:  Vitals:    08/22/17 2129   BP: 105/65   Pulse: 84   Resp: 19   Temp: 98.5 °F (36.9 °C)   TempSrc: Oral   SpO2: 99%   Weight:  "49.9 kg (110 lb)   Height: 5' 6" (1.676 m)            The Emergency Provider reviewed the vital signs and test results, which are outlined above.    ED Discussion     9:56 PM: Reassessed pt at this time. Pt is awake, alert, and in NAD. Discussed with pt all pertinent ED information. Discussed pt dx and plan of tx. Gave pt all f/u and return to the ED instructions. All questions and concerns were addressed at this time. Pt expresses understanding of information and instructions, and is comfortable with plan to discharge. Pt is stable for discharge.    I discussed with patient and/or family/caretaker that evaluation in the ED does not suggest any emergent or life threatening medical conditions requiring immediate intervention beyond what was provided in the ED, and I believe patient is safe for discharge.  Regardless, an unremarkable evaluation in the ED does not preclude the development or presence of a serious of life threatening condition. As such, patient was instructed to return immediately for any worsening or change in current symptoms.      ED Medication(s):  Medications - No data to display    Discharge Medication List as of 8/22/2017 10:21 PM          Follow-up Information     Josee Stewart MD.    Specialty:  Neurology  Why:  Keep appointment on 8/29/2017  Contact information:  [address] [place] [id number] [phone]                     Medical Decision Making              Scribe Attestation:   Scribe #1: I performed the above scribed service and the documentation accurately describes the services I performed. I attest to the accuracy of the note.    Attending:   Physician Attestation Statement for Scribe #1: I, KAYLA Strong, personally performed the services described in this documentation, as scribed by Kishan Pedersen, in my presence, and it is both accurate and complete.          Clinical Impression       ICD-10-CM ICD-9-CM   1. Medication refill Z76.0 V68.1   2. History of bipolar disorder " Z86.59 V11.1   3. History of ADHD Z86.59 V11.8       Disposition:   Disposition: Discharged  Condition: Stable         Cherelle Irwin PA-C  08/23/17 0231

## 2017-08-24 ENCOUNTER — HOSPITAL ENCOUNTER (EMERGENCY)
Facility: HOSPITAL | Age: 27
Discharge: HOME | End: 2017-08-24
Attending: EMERGENCY MEDICINE
Payer: MEDICAID

## 2017-08-24 VITALS
HEART RATE: 119 BPM | SYSTOLIC BLOOD PRESSURE: 95 MMHG | TEMPERATURE: 99 F | HEIGHT: 66 IN | DIASTOLIC BLOOD PRESSURE: 63 MMHG | OXYGEN SATURATION: 100 % | RESPIRATION RATE: 20 BRPM | BODY MASS INDEX: 17.68 KG/M2 | WEIGHT: 110 LBS

## 2017-08-24 DIAGNOSIS — R30.0 DYSURIA: Primary | ICD-10-CM

## 2017-08-24 LAB
B-HCG UR QL: NEGATIVE
BACTERIA #/AREA URNS HPF: ABNORMAL /HPF
BILIRUB UR QL STRIP: NEGATIVE
CLARITY UR: CLEAR
COLOR UR: YELLOW
GLUCOSE UR QL STRIP: NEGATIVE
HGB UR QL STRIP: ABNORMAL
KETONES UR QL STRIP: NEGATIVE
LEUKOCYTE ESTERASE UR QL STRIP: NEGATIVE
MICROSCOPIC COMMENT: ABNORMAL
NITRITE UR QL STRIP: NEGATIVE
NON-SQ EPI CELLS #/AREA URNS HPF: 5 /HPF
PH UR STRIP: 8 [PH] (ref 5–8)
PROT UR QL STRIP: NEGATIVE
RBC #/AREA URNS HPF: 5 /HPF (ref 0–4)
SP GR UR STRIP: <=1.005 (ref 1–1.03)
SQUAMOUS #/AREA URNS HPF: 20 /HPF
URN SPEC COLLECT METH UR: ABNORMAL
UROBILINOGEN UR STRIP-ACNC: NEGATIVE EU/DL
WBC #/AREA URNS HPF: 10 /HPF (ref 0–5)
YEAST URNS QL MICRO: ABNORMAL

## 2017-08-24 PROCEDURE — 81025 URINE PREGNANCY TEST: CPT

## 2017-08-24 PROCEDURE — 81000 URINALYSIS NONAUTO W/SCOPE: CPT

## 2017-08-24 PROCEDURE — 99283 EMERGENCY DEPT VISIT LOW MDM: CPT

## 2017-08-24 RX ORDER — PHENAZOPYRIDINE HYDROCHLORIDE 200 MG/1
200 TABLET, FILM COATED ORAL 3 TIMES DAILY
Qty: 6 TABLET | Refills: 0 | Status: SHIPPED | OUTPATIENT
Start: 2017-08-24 | End: 2017-09-03

## 2017-08-24 RX ORDER — NITROFURANTOIN 25; 75 MG/1; MG/1
100 CAPSULE ORAL 2 TIMES DAILY
Qty: 14 CAPSULE | Refills: 0 | Status: SHIPPED | OUTPATIENT
Start: 2017-08-24 | End: 2017-08-31

## 2017-08-24 NOTE — ED PROVIDER NOTES
Encounter Date: 2017       History     Chief Complaint   Patient presents with    Urinary Tract Infection     27 year old female with complaint of painful urination with urinary urgency X one month.  No fever or chills.  No vomiting.  No back pain.  Denies vaginal discharge and vaginal lesions. No alleviating factors.           Review of patient's allergies indicates:   Allergen Reactions    Sulfa (sulfonamide antibiotics) Rash     Past Medical History:   Diagnosis Date    ADHD (attention deficit hyperactivity disorder)     Bipolar 2 disorder     Scoliosis      Past Surgical History:   Procedure Laterality Date     SECTION  10/20/2015    x 1    DILATION AND CURETTAGE OF UTERUS      WISDOM TOOTH EXTRACTION       Family History   Problem Relation Age of Onset    No Known Problems Mother     Breast cancer Neg Hx     Colon cancer Neg Hx     Ovarian cancer Neg Hx      Social History   Substance Use Topics    Smoking status: Current Every Day Smoker     Packs/day: 0.25    Smokeless tobacco: Never Used      Comment: no smoking after m.n prior to surgery    Alcohol use 0.0 oz/week      Comment: socially  No alcohol prior to surgery     Review of Systems   Constitutional: Negative for fever.   HENT: Negative for sore throat.    Respiratory: Negative for shortness of breath.    Cardiovascular: Negative for chest pain.   Gastrointestinal: Negative for nausea.   Genitourinary: Positive for dysuria and urgency.   Musculoskeletal: Negative for back pain.   Skin: Negative for rash.   Neurological: Negative for weakness.   Hematological: Does not bruise/bleed easily.       Physical Exam     Initial Vitals [17 0644]   BP Pulse Resp Temp SpO2   95/63 (!) 119 20 98.5 °F (36.9 °C) 100 %      MAP       73.67         Physical Exam    Nursing note and vitals reviewed.  Constitutional: She appears well-developed and well-nourished.   HENT:   Head: Normocephalic and atraumatic.   Eyes: Conjunctivae and EOM  are normal. Pupils are equal, round, and reactive to light.   Neck: Normal range of motion. Neck supple.   Cardiovascular: Normal rate, regular rhythm, normal heart sounds and intact distal pulses.   Pulmonary/Chest: Breath sounds normal.   Abdominal: Soft. There is no tenderness. There is no rebound and no guarding.   Musculoskeletal: Normal range of motion.   Neurological: She is alert and oriented to person, place, and time. She has normal strength and normal reflexes.   Skin: Skin is warm and dry.   Psychiatric: She has a normal mood and affect. Her behavior is normal. Thought content normal.         ED Course   Procedures  Labs Reviewed   URINALYSIS - Abnormal; Notable for the following:        Result Value    Specific Gravity, UA <=1.005 (*)     Occult Blood UA 1+ (*)     All other components within normal limits   URINALYSIS MICROSCOPIC - Abnormal; Notable for the following:     RBC, UA 5 (*)     WBC, UA 10 (*)     Yeast, UA Rare (*)     Non-Squam Epith 5 (*)     All other components within normal limits   PREGNANCY TEST, URINE RAPID                               ED Course     Clinical Impression:   The encounter diagnosis was Dysuria.                           Jm Aldridge NP  08/24/17 2337

## 2017-08-25 ENCOUNTER — HOSPITAL ENCOUNTER (EMERGENCY)
Facility: HOSPITAL | Age: 27
Discharge: HOME | End: 2017-08-25
Attending: EMERGENCY MEDICINE
Payer: MEDICAID

## 2017-08-25 VITALS
DIASTOLIC BLOOD PRESSURE: 69 MMHG | OXYGEN SATURATION: 100 % | WEIGHT: 110 LBS | SYSTOLIC BLOOD PRESSURE: 110 MMHG | TEMPERATURE: 99 F | RESPIRATION RATE: 18 BRPM | HEART RATE: 116 BPM | BODY MASS INDEX: 17.75 KG/M2

## 2017-08-25 DIAGNOSIS — R30.0 DYSURIA: Primary | ICD-10-CM

## 2017-08-25 PROCEDURE — 99283 EMERGENCY DEPT VISIT LOW MDM: CPT

## 2017-08-25 RX ORDER — TRAMADOL HYDROCHLORIDE 50 MG/1
50 TABLET ORAL EVERY 6 HOURS PRN
Qty: 18 TABLET | Refills: 0 | Status: SHIPPED | OUTPATIENT
Start: 2017-08-25 | End: 2018-01-10

## 2017-08-25 NOTE — ED PROVIDER NOTES
Encounter Date: 2017       History     Chief Complaint   Patient presents with    Dysuria     burning with urination, fever     27 year old female with complaint of burning with urination X one week.  Pt reports history of dysuria X many months.  Pt had urinalysis yesterday that did not show UTI.  Pt denies vaginal lesions.  No vaginal swelling.  No vomiting.  No diarrhea. NO vaginal discharge.            Review of patient's allergies indicates:   Allergen Reactions    Sulfa (sulfonamide antibiotics) Rash     Past Medical History:   Diagnosis Date    ADHD (attention deficit hyperactivity disorder)     Bipolar 2 disorder     Scoliosis      Past Surgical History:   Procedure Laterality Date     SECTION  10/20/2015    x 1    DILATION AND CURETTAGE OF UTERUS      WISDOM TOOTH EXTRACTION       Family History   Problem Relation Age of Onset    No Known Problems Mother     Breast cancer Neg Hx     Colon cancer Neg Hx     Ovarian cancer Neg Hx      Social History   Substance Use Topics    Smoking status: Current Every Day Smoker     Packs/day: 0.25    Smokeless tobacco: Never Used      Comment: no smoking after m.n prior to surgery    Alcohol use 0.0 oz/week      Comment: socially  No alcohol prior to surgery     Review of Systems   Constitutional: Negative for fever.   HENT: Negative for sore throat.    Respiratory: Negative for shortness of breath.    Cardiovascular: Negative for chest pain.   Gastrointestinal: Negative for nausea.   Genitourinary: Positive for dysuria.   Musculoskeletal: Negative for back pain.   Skin: Negative for rash.   Neurological: Negative for weakness.   Hematological: Does not bruise/bleed easily.       Physical Exam     Initial Vitals [17 0803]   BP Pulse Resp Temp SpO2   110/69 (!) 116 18 98.5 °F (36.9 °C) 100 %      MAP       82.67         Physical Exam    Nursing note and vitals reviewed.  Constitutional: She appears well-developed and well-nourished.   HENT:    Head: Normocephalic and atraumatic.   Eyes: Conjunctivae and EOM are normal. Pupils are equal, round, and reactive to light.   Neck: Normal range of motion. Neck supple.   Cardiovascular: Normal rate, regular rhythm, normal heart sounds and intact distal pulses.   Pulmonary/Chest: Breath sounds normal.   Abdominal: Soft. There is no tenderness.   Musculoskeletal: Normal range of motion.   Neurological: She is alert and oriented to person, place, and time. She has normal strength and normal reflexes.   Skin: Skin is warm and dry.   Psychiatric: She has a normal mood and affect. Her behavior is normal. Thought content normal.         ED Course   Procedures  Labs Reviewed - No data to display          Medical Decision Making:   Diff dx: interstitial cystitis; pt currently on Macrobid and Pyridium.  Will add ultram and GYN follow up                    ED Course     Clinical Impression:   The encounter diagnosis was Dysuria.                           Jm Aldridge NP  08/25/17 0804

## 2017-08-27 ENCOUNTER — HOSPITAL ENCOUNTER (EMERGENCY)
Facility: HOSPITAL | Age: 27
Discharge: HOME | End: 2017-08-27
Attending: EMERGENCY MEDICINE
Payer: MEDICAID

## 2017-08-27 VITALS
BODY MASS INDEX: 17.68 KG/M2 | HEIGHT: 66 IN | TEMPERATURE: 99 F | HEART RATE: 106 BPM | WEIGHT: 110 LBS | DIASTOLIC BLOOD PRESSURE: 70 MMHG | OXYGEN SATURATION: 100 % | RESPIRATION RATE: 18 BRPM | SYSTOLIC BLOOD PRESSURE: 135 MMHG

## 2017-08-27 DIAGNOSIS — N32.89 BLADDER SPASMS: ICD-10-CM

## 2017-08-27 DIAGNOSIS — R30.0 DYSURIA: Primary | ICD-10-CM

## 2017-08-27 PROCEDURE — 99283 EMERGENCY DEPT VISIT LOW MDM: CPT

## 2017-08-27 RX ORDER — OXYBUTYNIN CHLORIDE 5 MG/1
5 TABLET ORAL 3 TIMES DAILY
Qty: 90 TABLET | Refills: 0 | Status: SHIPPED | OUTPATIENT
Start: 2017-08-27 | End: 2018-01-10

## 2017-08-28 ENCOUNTER — HOSPITAL ENCOUNTER (EMERGENCY)
Facility: HOSPITAL | Age: 27
Discharge: LEFT BEFORE DISCHARGE | End: 2017-08-28
Attending: EMERGENCY MEDICINE
Payer: MEDICAID

## 2017-08-28 VITALS
BODY MASS INDEX: 17.68 KG/M2 | TEMPERATURE: 99 F | WEIGHT: 110 LBS | OXYGEN SATURATION: 100 % | RESPIRATION RATE: 18 BRPM | DIASTOLIC BLOOD PRESSURE: 63 MMHG | HEIGHT: 66 IN | HEART RATE: 110 BPM | SYSTOLIC BLOOD PRESSURE: 111 MMHG

## 2017-08-28 DIAGNOSIS — G89.29 CHRONIC ABDOMINAL PAIN: Primary | ICD-10-CM

## 2017-08-28 DIAGNOSIS — R10.9 CHRONIC ABDOMINAL PAIN: Primary | ICD-10-CM

## 2017-08-28 PROCEDURE — 99284 EMERGENCY DEPT VISIT MOD MDM: CPT

## 2017-08-28 NOTE — ED PROVIDER NOTES
SCRIBE #1 NOTE: I, Nicholas Stanley, am scribing for, and in the presence of, Blake Lowery Jr., MD. I have scribed the entire note.      History      Chief Complaint   Patient presents with    Dysuria     has been burning w/ urination. previously seen for same s/s       Review of patient's allergies indicates:   Allergen Reactions    Sulfa (sulfonamide antibiotics) Rash        HPI   HPI    2017, 10:11 PM   History obtained from the patient      History of Present Illness: Noemi Morales is a 27 y.o. female patient who presents to the Emergency Department for dysuria which onset gradually 3 days ago. Symptoms are constant and moderate in severity. Pt was seen in the ED for similar sx and reports no relief from her medication. No mitigating or exacerbating factors reported. Associated sxs include suprapubic abdominal pain. Patient denies any fever, chills, n/v/d, blood in stool, constipation, hematuria, vaginal bleeding, vaginal discharge, and all other sxs at this time. No further complaints or concerns at this time.         Arrival mode: Personal vehicle     PCP: Jace Yadav MD       Past Medical History:  Past Medical History:   Diagnosis Date    ADHD (attention deficit hyperactivity disorder)     Bipolar 2 disorder     Scoliosis        Past Surgical History:  Past Surgical History:   Procedure Laterality Date     SECTION  10/20/2015    x 1    DILATION AND CURETTAGE OF UTERUS      WISDOM TOOTH EXTRACTION           Family History:  Family History   Problem Relation Age of Onset    No Known Problems Mother     Breast cancer Neg Hx     Colon cancer Neg Hx     Ovarian cancer Neg Hx        Social History:  Social History     Social History Main Topics    Smoking status: Current Every Day Smoker     Packs/day: 0.25    Smokeless tobacco: Never Used      Comment: no smoking after m.n prior to surgery    Alcohol use 0.0 oz/week      Comment: socially  No alcohol prior to surgery     Drug use: No    Sexual activity: Not Currently     Partners: Male       ROS   Review of Systems   Constitutional: Negative for chills and fever.   HENT: Negative for sore throat.    Respiratory: Negative for shortness of breath.    Cardiovascular: Negative for chest pain.   Gastrointestinal: Positive for abdominal pain (suprapubic). Negative for blood in stool, constipation, diarrhea, nausea and vomiting.   Genitourinary: Positive for dysuria. Negative for hematuria, vaginal discharge and vaginal pain.   Musculoskeletal: Negative for back pain.   Skin: Negative for rash.   Neurological: Negative for weakness.   Hematological: Does not bruise/bleed easily.       Physical Exam      Initial Vitals [08/27/17 2158]   BP Pulse Resp Temp SpO2   135/70 106 18 98.6 °F (37 °C) 100 %      MAP       91.67          Physical Exam  Nursing Notes and Vital Signs Reviewed.  Constitutional: Patient is in no acute distress. Well-developed and well-nourished.  Head: Atraumatic. Normocephalic.  Eyes: PERRL. EOM intact. Conjunctivae are not pale. No scleral icterus.  ENT: Mucous membranes are moist. Oropharynx is clear and symmetric.    Neck: Supple. Full ROM. No lymphadenopathy.  Cardiovascular: Regular rate. Regular rhythm. No murmurs, rubs, or gallops. Distal pulses are 2+ and symmetric.  Pulmonary/Chest: No respiratory distress. Clear to auscultation bilaterally. No wheezing, rales, or rhonchi.  Abdominal: Soft and non-distended.  There is suprapubic tenderness.  No rebound, guarding, or rigidity.   Musculoskeletal: Moves all extremities. No obvious deformities. No edema.   Skin: Warm and dry.  Neurological:  Alert, awake, and appropriate.  Normal speech.  No acute focal neurological deficits are appreciated.  Psychiatric: Normal affect. Good eye contact. Appropriate in content.    ED Course    Procedures  ED Vital Signs:  Vitals:    08/27/17 2158   BP: 135/70   Pulse: 106   Resp: 18   Temp: 98.6 °F (37 °C)   TempSrc: Oral  "  SpO2: 100%   Weight: 49.9 kg (110 lb)   Height: 5' 6" (1.676 m)                  The Emergency Provider reviewed the vital signs and test results, which are outlined above.    ED Discussion     10:16 PM:  Discussed pt dx and plan of tx. Gave pt all f/u and return to the ED instructions. All questions and concerns were addressed at this time. Pt expresses understanding of information and instructions, and is comfortable with plan to discharge. Pt is stable for discharge.    I discussed with patient and/or family/caretaker that evaluation in the ED does not suggest any emergent or life threatening medical conditions requiring immediate intervention beyond what was provided in the ED, and I believe patient is safe for discharge.  Regardless, an unremarkable evaluation in the ED does not preclude the development or presence of a serious of life threatening condition. As such, patient was instructed to return immediately for any worsening or change in current symptoms.      ED Medication(s):  Medications - No data to display    Discharge Medication List as of 8/27/2017 10:12 PM      START taking these medications    Details   oxybutynin (DITROPAN) 5 MG Tab Take 1 tablet (5 mg total) by mouth 3 (three) times daily., Starting Sun 8/27/2017, Until Tue 9/26/2017, Print             Follow-up Information     Deo Loco IV, MD. Call in 2 days.    Specialty:  Urology  Contact information:  [address] [id number] [phone]                     Medical Decision Making              Scribe Attestation:   Scribe #1: I performed the above scribed service and the documentation accurately describes the services I performed. I attest to the accuracy of the note.    Attending:   Physician Attestation Statement for Scribe #1: I, Blake Lowery Jr., MD, personally performed the services described in this documentation, as scribed by Nicholas Stanley, in my presence, and it is both accurate and complete.          Clinical " Impression       ICD-10-CM ICD-9-CM   1. Dysuria R30.0 788.1   2. Bladder spasms N32.89 596.89       Disposition:   Disposition: Discharged  Condition: Stable         Blaek Lowery Jr., MD  08/28/17 0434

## 2017-08-29 NOTE — ED PROVIDER NOTES
SCRIBE #1 NOTE: I, Iraida Mckeon, am scribing for, and in the presence of, Blake Lowery Jr., MD. I have scribed the entire note.      History      Chief Complaint   Patient presents with    Abdominal Pain     pt c/o bladder pain; seen here last night and dx with bladder spasms       Review of patient's allergies indicates:   Allergen Reactions    Sulfa (sulfonamide antibiotics) Rash        HPI   HPI    2017, 9:55 PM   History obtained from the patient      History of Present Illness: Noemi Morales is a 27 y.o. female patient who presents to the Emergency Department for lower abdominal pain which onset gradually several months ago. She states that the pain is unbearable. The pain has been constant in course. Pt has been evaluated here several times within the past 2 months with similar complaints; she was evaluated last night for dysuria and abdominal pain and dx with dysuria and bladder spasms. No mitigating or exacerbating factors reported. Patient denies fever, chills, diaphoresis, N/V/D, hematuria, vaginal discharge, urinary frequency, and all other sxs at this time. No further complaints or concerns at this time.     Arrival mode: Personal vehicle    PCP: Jace Yadav MD       Past Medical History:  Past Medical History:   Diagnosis Date    ADHD (attention deficit hyperactivity disorder)     Bipolar 2 disorder     Scoliosis        Past Surgical History:  Past Surgical History:   Procedure Laterality Date     SECTION  10/20/2015    x 1    DILATION AND CURETTAGE OF UTERUS      WISDOM TOOTH EXTRACTION           Family History:  Family History   Problem Relation Age of Onset    No Known Problems Mother     Breast cancer Neg Hx     Colon cancer Neg Hx     Ovarian cancer Neg Hx        Social History:  Social History     Social History Main Topics    Smoking status: Current Every Day Smoker     Packs/day: 0.25    Smokeless tobacco: Never Used      Comment: no smoking  after m.n prior to surgery    Alcohol use 0.0 oz/week      Comment: socially  No alcohol prior to surgery    Drug use: No    Sexual activity: Not Currently     Partners: Male       ROS   Review of Systems   Constitutional: Negative for chills, diaphoresis and fever.   HENT: Negative for sore throat.    Respiratory: Negative for shortness of breath.    Cardiovascular: Negative for chest pain.   Gastrointestinal: Positive for abdominal pain. Negative for diarrhea, nausea and vomiting.   Genitourinary: Negative for decreased urine volume, flank pain, frequency, genital sores, hematuria, urgency, vaginal bleeding, vaginal discharge and vaginal pain.   Musculoskeletal: Negative for back pain.   Skin: Negative for rash.   Neurological: Negative for weakness.   Hematological: Does not bruise/bleed easily.   All other systems reviewed and are negative.      Physical Exam      Initial Vitals [08/28/17 2140]   BP Pulse Resp Temp SpO2   111/63 110 18 98.8 °F (37.1 °C) 100 %      MAP       79          Physical Exam  Nursing Notes and Vital Signs Reviewed.  Constitutional: Patient is in no acute distress. Awake and alert. Well-developed and well-nourished.  Head: Atraumatic. Normocephalic.  Eyes: PERRL. EOM intact. Conjunctivae are not pale. No scleral icterus.  ENT: Mucous membranes are moist. Oropharynx is clear and symmetric.    Neck: Supple. Full ROM. No lymphadenopathy.  Cardiovascular: Regular rate. Regular rhythm. No murmurs, rubs, or gallops. Distal pulses are 2+ and symmetric.  Pulmonary/Chest: No respiratory distress. Clear to auscultation bilaterally. No wheezing, rales, or rhonchi.  Abdominal: Soft and non-distended.  There is no tenderness.  No rebound, guarding, or rigidity.  Good bowel sounds.    : No CVA tenderness.  Musculoskeletal: Moves all extremities. No obvious deformities. No edema. No calf tenderness.  Skin: Warm and dry.  Neurological:  Alert, awake, and appropriate.  Normal speech.  No acute focal  "neurological deficits are appreciated.  Psychiatric: Normal affect. Good eye contact. Appropriate in content.    ED Course    Procedures  ED Vital Signs:  Vitals:    08/28/17 2140   BP: 111/63   Pulse: 110   Resp: 18   Temp: 98.8 °F (37.1 °C)   TempSrc: Oral   SpO2: 100%   Weight: 49.9 kg (110 lb)   Height: 5' 6" (1.676 m)     The Emergency Provider reviewed the vital signs and test results, which are outlined above.    ED Discussion     10:11 PM: Patient reports that she cannot stay for lab work or imaging because her son is at home with the  and the  states that she cannot stay any longer. Pt is A&O x3, appropriate and competent at this time. Pt voices desire to leave hospital. D/w pt in length need for further evaluation and treatment due to HPI and PEx. Pt declines any further evaluation or tx at this time. All risks, including worsening sx, permanent bodily harm and death, were discussed in length. Pt acknowledges all risk at this time and agrees to sign AMA form. Pt given RTER instructions. All questions and concerns addressed at this time. Pt leaving AMA.     ED Medication(s):  Medications - No data to display    Discharge Medication List as of 8/28/2017 10:25 PM               Medical Decision Making              Scribe Attestation:   Scribe #1: I performed the above scribed service and the documentation accurately describes the services I performed. I attest to the accuracy of the note.    Attending:   Physician Attestation Statement for Scribe #1: I, Blake Lowery Jr., MD, personally performed the services described in this documentation, as scribed by Iraida Mckeon, in my presence, and it is both accurate and complete.          Clinical Impression       ICD-10-CM ICD-9-CM   1. Chronic abdominal pain R10.9 789.00    G89.29 338.29       Disposition:   Disposition: LOLIA         Blake Lowery Jr., MD  08/29/17 0526    "

## 2017-08-29 NOTE — ED NOTES
Patient at nurses station reports she has to leave due to  issues. Dr. Lowery notified. Patient given risks of leaving AMA. Patient verbalized understanding. AMA form signed by MD, patient, and nurse.

## 2018-01-10 ENCOUNTER — OFFICE VISIT (OUTPATIENT)
Dept: OBSTETRICS AND GYNECOLOGY | Facility: CLINIC | Age: 28
End: 2018-01-10
Payer: MEDICAID

## 2018-01-10 VITALS
WEIGHT: 98.75 LBS | BODY MASS INDEX: 15.87 KG/M2 | SYSTOLIC BLOOD PRESSURE: 118 MMHG | DIASTOLIC BLOOD PRESSURE: 66 MMHG | HEIGHT: 66 IN

## 2018-01-10 DIAGNOSIS — F31.9 BIPOLAR 1 DISORDER: ICD-10-CM

## 2018-01-10 DIAGNOSIS — N91.2 AMENORRHEA: Primary | ICD-10-CM

## 2018-01-10 PROBLEM — R10.2 PELVIC PAIN IN FEMALE: Status: RESOLVED | Noted: 2017-04-18 | Resolved: 2018-01-10

## 2018-01-10 PROBLEM — N93.9 ABNORMAL UTERINE BLEEDING: Status: RESOLVED | Noted: 2017-04-18 | Resolved: 2018-01-10

## 2018-01-10 PROCEDURE — 99212 OFFICE O/P EST SF 10 MIN: CPT | Mod: S$PBB,,, | Performed by: ADVANCED PRACTICE MIDWIFE

## 2018-01-10 PROCEDURE — 99212 OFFICE O/P EST SF 10 MIN: CPT | Mod: PBBFAC,PO | Performed by: ADVANCED PRACTICE MIDWIFE

## 2018-01-10 PROCEDURE — 81025 URINE PREGNANCY TEST: CPT | Mod: PBBFAC,PO | Performed by: ADVANCED PRACTICE MIDWIFE

## 2018-01-10 PROCEDURE — 99999 PR PBB SHADOW E&M-EST. PATIENT-LVL II: CPT | Mod: PBBFAC,,, | Performed by: ADVANCED PRACTICE MIDWIFE

## 2018-01-10 NOTE — PROGRESS NOTES
"Subjective:      Noemi Morales is a 27 y.o. female who presents for evaluation of  Unprotected sex over holidays. She believes she could be pregnant. Patient is ambivalent about pregnancy. Sexual Activity: single partner, contraception: condoms. Current symptoms also include: frequent urination. Last period was normal.     Patient's last menstrual period was 12/16/2017 (exact date).  The following portions of the patient's history were reviewed and updated as appropriate: allergies, current medications, past family history, past medical history, past social history, past surgical history and problem list.    Review of Systems  A comprehensive review of systems was negative.       Objective:      /66 (BP Location: Left arm, Patient Position: Sitting, BP Method: Medium (Manual))   Ht 5' 6" (1.676 m)   Wt 44.8 kg (98 lb 12.3 oz)   LMP 12/16/2017 (Exact Date)   BMI 15.94 kg/m²   General: alert, appears stated age, cooperative, no distress and no acute distress      Lab Review  Urine HCG: negative      Assessment:      Absence of menstruation.       Plan:      Pregnancy Test: Negative: Patient reassured. Contraceptive counseling done. Planned contraceptive method: condoms. If menses has not started in two weeks return to clinic for another pregnancy test.   "

## 2018-05-02 ENCOUNTER — LAB VISIT (OUTPATIENT)
Dept: LAB | Facility: HOSPITAL | Age: 28
End: 2018-05-02
Attending: NURSE PRACTITIONER
Payer: MEDICAID

## 2018-05-02 DIAGNOSIS — F31.61 MILD MIXED BIPOLAR I DISORDER: Primary | ICD-10-CM

## 2018-05-02 LAB
ALBUMIN SERPL BCP-MCNC: 4 G/DL
ALP SERPL-CCNC: 58 U/L
ALT SERPL W/O P-5'-P-CCNC: 9 U/L
AST SERPL-CCNC: 15 U/L
BILIRUB DIRECT SERPL-MCNC: 0.1 MG/DL
BILIRUB SERPL-MCNC: 0.3 MG/DL
CHOLEST SERPL-MCNC: 165 MG/DL
CHOLEST/HDLC SERPL: 3 {RATIO}
HDLC SERPL-MCNC: 55 MG/DL
HDLC SERPL: 33.3 %
LDLC SERPL CALC-MCNC: 89.2 MG/DL
NONHDLC SERPL-MCNC: 110 MG/DL
PROT SERPL-MCNC: 6.6 G/DL
TRIGL SERPL-MCNC: 104 MG/DL

## 2018-05-02 PROCEDURE — 80076 HEPATIC FUNCTION PANEL: CPT

## 2018-05-02 PROCEDURE — 80061 LIPID PANEL: CPT

## 2018-05-02 PROCEDURE — 36415 COLL VENOUS BLD VENIPUNCTURE: CPT

## 2018-05-02 PROCEDURE — 83036 HEMOGLOBIN GLYCOSYLATED A1C: CPT

## 2018-05-03 LAB
ESTIMATED AVG GLUCOSE: 91 MG/DL
HBA1C MFR BLD HPLC: 4.8 %

## 2018-06-04 ENCOUNTER — APPOINTMENT (OUTPATIENT)
Dept: LAB | Facility: HOSPITAL | Age: 28
End: 2018-06-04
Attending: NURSE PRACTITIONER
Payer: MEDICAID

## 2018-06-04 DIAGNOSIS — F31.61 MILD MIXED BIPOLAR I DISORDER: Primary | ICD-10-CM

## 2018-06-04 LAB
AMPHET+METHAMPHET UR QL: NORMAL
B-HCG UR QL: NEGATIVE
BARBITURATES UR QL SCN>200 NG/ML: NEGATIVE
BENZODIAZ UR QL SCN>200 NG/ML: NEGATIVE
BZE UR QL SCN: NEGATIVE
CANNABINOIDS UR QL SCN: NEGATIVE
CREAT UR-MCNC: 24 MG/DL
ETHANOL UR-MCNC: <10 MG/DL
METHADONE UR QL SCN>300 NG/ML: NEGATIVE
OPIATES UR QL SCN: NEGATIVE
PCP UR QL SCN>25 NG/ML: NEGATIVE
TOXICOLOGY INFORMATION: NORMAL

## 2018-06-04 PROCEDURE — 80307 DRUG TEST PRSMV CHEM ANLYZR: CPT

## 2018-06-04 PROCEDURE — 81025 URINE PREGNANCY TEST: CPT

## 2020-01-15 ENCOUNTER — OFFICE VISIT (OUTPATIENT)
Dept: OBSTETRICS AND GYNECOLOGY | Facility: CLINIC | Age: 30
End: 2020-01-15
Payer: MEDICAID

## 2020-01-15 VITALS
SYSTOLIC BLOOD PRESSURE: 118 MMHG | HEIGHT: 66 IN | WEIGHT: 131.81 LBS | BODY MASS INDEX: 21.18 KG/M2 | DIASTOLIC BLOOD PRESSURE: 72 MMHG

## 2020-01-15 DIAGNOSIS — Z98.891 PREVIOUS CESAREAN SECTION: ICD-10-CM

## 2020-01-15 DIAGNOSIS — F31.9 BIPOLAR 1 DISORDER: ICD-10-CM

## 2020-01-15 DIAGNOSIS — F90.0 ATTENTION DEFICIT HYPERACTIVITY DISORDER (ADHD), PREDOMINANTLY INATTENTIVE TYPE: ICD-10-CM

## 2020-01-15 DIAGNOSIS — Z32.01 POSITIVE PREGNANCY TEST: Primary | ICD-10-CM

## 2020-01-15 PROCEDURE — 99213 OFFICE O/P EST LOW 20 MIN: CPT | Mod: PBBFAC,TH | Performed by: MIDWIFE

## 2020-01-15 PROCEDURE — 99999 PR PBB SHADOW E&M-EST. PATIENT-LVL III: CPT | Mod: PBBFAC,,, | Performed by: MIDWIFE

## 2020-01-15 PROCEDURE — 99203 PR OFFICE/OUTPT VISIT, NEW, LEVL III, 30-44 MIN: ICD-10-PCS | Mod: TH,S$PBB,, | Performed by: MIDWIFE

## 2020-01-15 PROCEDURE — 88175 CYTOPATH C/V AUTO FLUID REDO: CPT

## 2020-01-15 PROCEDURE — 87086 URINE CULTURE/COLONY COUNT: CPT

## 2020-01-15 PROCEDURE — 87491 CHLMYD TRACH DNA AMP PROBE: CPT

## 2020-01-15 PROCEDURE — 99999 PR PBB SHADOW E&M-EST. PATIENT-LVL III: ICD-10-PCS | Mod: PBBFAC,,, | Performed by: MIDWIFE

## 2020-01-15 PROCEDURE — 99203 OFFICE O/P NEW LOW 30 MIN: CPT | Mod: TH,S$PBB,, | Performed by: MIDWIFE

## 2020-01-15 RX ORDER — CLONAZEPAM 0.5 MG/1
TABLET ORAL
COMMUNITY
Start: 2019-12-27

## 2020-01-15 RX ORDER — DEXTROAMPHETAMINE SACCHARATE, AMPHETAMINE ASPARTATE, DEXTROAMPHETAMINE SULFATE AND AMPHETAMINE SULFATE 5; 5; 5; 5 MG/1; MG/1; MG/1; MG/1
TABLET ORAL
COMMUNITY
Start: 2020-01-08

## 2020-01-15 RX ORDER — MIRTAZAPINE 15 MG/1
TABLET, FILM COATED ORAL
COMMUNITY
Start: 2019-12-16

## 2020-01-15 RX ORDER — OLANZAPINE 20 MG/1
20 TABLET ORAL DAILY
COMMUNITY
Start: 2019-12-12

## 2020-01-15 NOTE — PROGRESS NOTES
CHIEF COMPLAINT:   Patient presents with      Possible Pregnancy        HISTORY OF PRESENT ILLNESS  Noemi Morales 29 y.o.  presents for pregnancy risk assessment.   The patient has no complaints today.  No nausea or vomiting. No bleeding or pain.  Pregnancy was not planned but is desired.  Partner is supportive of pregnancy.  Pt unsure of dating, LMP 19, conception on 19. Lives at home with mother. Boyfriend lives in [place]. Planning to move to [place] but unsure when.  Last pregnancy resulted in a  section for breech presentation. Desiring a repeat c/s for this delivery. Not currently working, trying to find work as a .  Denies domestic abuse.  Denies chemical/pesticide/radiation exposure.     OB history:      LMP: Patient's last menstrual period was 2019 (exact date).  EDC: Estimated Date of Delivery: None noted.  EGA: Unknown     Health Maintenance   Topic Date Due    TETANUS VACCINE  2009    Pap Smear  2019    Lipid Panel  Completed       Past Medical History:   Diagnosis Date    ADHD (attention deficit hyperactivity disorder)     Bipolar 1 disorder     History of chlamydia     Scoliosis        Past Surgical History:   Procedure Laterality Date     SECTION  10/20/2015    x 1    DILATION AND CURETTAGE OF UTERUS      WISDOM TOOTH EXTRACTION         Family History   Problem Relation Age of Onset    No Known Problems Mother     Breast cancer Neg Hx     Colon cancer Neg Hx     Ovarian cancer Neg Hx     Uterine cancer Neg Hx     Cervical cancer Neg Hx        Social History     Socioeconomic History    Marital status: Legally      Spouse name: Not on file    Number of children: Not on file    Years of education: Not on file    Highest education level: Not on file   Occupational History    Occupation: Coding    Social Needs    Financial resource strain: Not on file    Food insecurity:     Worry: Not on file      Inability: Not on file    Transportation needs:     Medical: Not on file     Non-medical: Not on file   Tobacco Use    Smoking status: Former Smoker     Packs/day: 0.00    Smokeless tobacco: Never Used    Tobacco comment: no smoking after m.n prior to surgery   Substance and Sexual Activity    Alcohol use: No     Alcohol/week: 0.0 standard drinks     Comment: socially  No alcohol prior to surgery    Drug use: No    Sexual activity: Yes     Partners: Male   Lifestyle    Physical activity:     Days per week: Not on file     Minutes per session: Not on file    Stress: Not on file   Relationships    Social connections:     Talks on phone: Not on file     Gets together: Not on file     Attends Gnosticism service: Not on file     Active member of club or organization: Not on file     Attends meetings of clubs or organizations: Not on file     Relationship status: Not on file   Other Topics Concern    Not on file   Social History Narrative    Not on file       Current Outpatient Medications   Medication Sig Dispense Refill    clonazePAM (KLONOPIN) 0.5 MG tablet TK 1 T PO BID      dextroamphetamine-amphetamine (ADDERALL) 20 mg tablet TK 1 T  PO D AT 1PM      lamotrigine (LAMICTAL) 200 MG tablet Take 1 tablet (200 mg total) by mouth 2 (two) times daily. 14 tablet 2    OLANZapine (ZYPREXA) 20 MG tablet Take 20 mg by mouth once daily.      mirtazapine (REMERON) 15 MG tablet TK 1 T PO HS       No current facility-administered medications for this visit.        Review of patient's allergies indicates:   Allergen Reactions    Sulfa (sulfonamide antibiotics) Rash         PHYSICAL EXAM   Vitals:    01/15/20 1033   BP: 118/72        PAIN SCALE: 0/10 None    PHYSICAL EXAM    ROS:  GENERAL: No fever, chills, fatigability or weight loss.  CV: Denies chest pain  PULM: Denies shortness of breath or wheezing.  ABDOMEN: Appetite fine. No weight loss. Denies diarrhea, abdominal pain, hematemesis or blood in stool.  URINARY:  No flank pain, dysuria or hematuria.  REPRODUCTIVE: No abnormal vaginal bleeding.       PE:   APPEARANCE: Well nourished, well developed, in no acute distress  CHEST: Clear to auscultation bilaterally  CV: Regular rate and rhythm  BREASTS: Symmetrical, no skin changes or visible lesions. No palpable masses, nipple discharge or adenopathy bilaterally.  ABDOMEN: Soft. No tenderness or masses. No hepatosplenomegaly. No hernias  PELVIC:   VULVA: No lesions. Normal female genitalia.  URETHRAL MEATUS: Normal size and location, no lesions, no prolapse.  URETHRA: No masses, tenderness, prolapse or scarring.  VAGINA: Moist and well rugated, no discharge, no significant cystocele or rectocele.  CERVIX: No lesions, normal diameter, no stenosis, no cervical motion tenderness.   UTERUS: appropriate to gestational size, regular shape, mobile, non-tender, normal position, good support.  ADNEXA: No masses, tenderness or CDS nodularity.  ANUS PERINEUM: No lesions, no relaxation, no external hemorrhoids.     UPT +    A/P:     -      Patient was counseled today on A.C.S. Pap guidelines and recommendations for yearly pelvic exams, mammograms and monthly self breast exams; to see her PCP for other health maintenance and pregnancy.   -      Patient's medications and medical history reviewed with patient and implications in pregnancy.   -      Pregnancy course discussed and 'AtoZ' book given. Patient was counseled on proper weight gain based on the [employer]'s recommendations based on her pre-pregnancy weight. Discussed foods to avoid in pregnancy (i.e. sushi, fish that are high in mercury, deli meat, and unpasteurized cheeses). Discussed prenatal vitamin options (i.e. stool softener, DHA). Discussed potential medical problems in pregnancy.  -     Discussed risk of Toxoplasmosis transmission from pets and reviewed risk reduction techniques.  -     Discused increased risks with AMA status.    -     Chromosomal abnormality risk  discussed and available testing offered - declined.   -     Pt was counseled on exercise in pregnancy and weight gain recommendations.  -     Pt was counseled on travel recommendations and on risks of Zika virus exposure.  Current CDC Zika advisories and prevention techniques were reviewed with pt.  Pt denies any recent international travel and does not plan travel during pregnancy.  Pt reports that partner does not plan travel either.  -     Oriented to practice including CNM collaboration.   -     New OB labs tomorrow per pt request, PapSmear collected today  - MARTIN consents obtained for delivery records from last delivery.   - Reviewed medications, advised to discontinue Adderall and Klonopin. Pt will schedule appointment with Psychiatrist to manage Bipolar medication.  - Initial OB and dating US ordered

## 2020-01-15 NOTE — PATIENT INSTRUCTIONS
"  Pregnancy: Your First Trimester Changes  The first trimester is a time of rapid development for your baby. Because your baby is growing so quickly, it is important that you start a healthy lifestyle right away. By the end of the first trimester, your baby has formed all of its major body organs and weighs just over an ounce.     Actual size of baby is 1/4"    Month 1 (Weeks 1 to 4)  The placenta (the organ that nourishes your baby) begins to form. The brain, spinal cord, heart, gastrointestinal tract, and lungs begin to develop. Your baby is about 1/4 inch long by the end of the first month.     Actual size of baby is 1"    Month 2 (Weeks 5 to 8)  All of your babys major body organs form. The face, fingers, toes, ears, and eyes appear. By the end of the month, your baby is about 1-inch long.     Actual size of baby is 4"    Month 3 (Weeks 9 to 12)  Your baby can open and close its fists and mouth. The sexual organs begin to form. As the first trimester ends, your baby is about 3-inches long.  Date Last Reviewed: 8/16/2015  © 4027-3457 [employer]. [address] [place] [address], [place] [address]. All rights reserved. This information is not intended as a substitute for professional medical care. Always follow your healthcare professional's instructions.        "

## 2020-01-16 ENCOUNTER — LAB VISIT (OUTPATIENT)
Dept: LAB | Facility: HOSPITAL | Age: 30
End: 2020-01-16
Payer: MEDICAID

## 2020-01-16 DIAGNOSIS — Z32.01 POSITIVE PREGNANCY TEST: ICD-10-CM

## 2020-01-16 LAB
ABO + RH BLD: NORMAL
BACTERIA UR CULT: NO GROWTH
BASOPHILS # BLD AUTO: 0.03 K/UL (ref 0–0.2)
BASOPHILS NFR BLD: 0.5 % (ref 0–1.9)
BLD GP AB SCN CELLS X3 SERPL QL: NORMAL
C TRACH DNA SPEC QL NAA+PROBE: NOT DETECTED
DIFFERENTIAL METHOD: ABNORMAL
EOSINOPHIL # BLD AUTO: 0.1 K/UL (ref 0–0.5)
EOSINOPHIL NFR BLD: 1.6 % (ref 0–8)
ERYTHROCYTE [DISTWIDTH] IN BLOOD BY AUTOMATED COUNT: 13.7 % (ref 11.5–14.5)
HCT VFR BLD AUTO: 41.9 % (ref 37–48.5)
HGB BLD-MCNC: 12.8 G/DL (ref 12–16)
IMM GRANULOCYTES # BLD AUTO: 0.02 K/UL (ref 0–0.04)
IMM GRANULOCYTES NFR BLD AUTO: 0.3 % (ref 0–0.5)
LYMPHOCYTES # BLD AUTO: 1.5 K/UL (ref 1–4.8)
LYMPHOCYTES NFR BLD: 23 % (ref 18–48)
MCH RBC QN AUTO: 28.8 PG (ref 27–31)
MCHC RBC AUTO-ENTMCNC: 30.5 G/DL (ref 32–36)
MCV RBC AUTO: 94 FL (ref 82–98)
MONOCYTES # BLD AUTO: 0.5 K/UL (ref 0.3–1)
MONOCYTES NFR BLD: 7.4 % (ref 4–15)
N GONORRHOEA DNA SPEC QL NAA+PROBE: NOT DETECTED
NEUTROPHILS # BLD AUTO: 4.3 K/UL (ref 1.8–7.7)
NEUTROPHILS NFR BLD: 67.2 % (ref 38–73)
NRBC BLD-RTO: 0 /100 WBC
PLATELET # BLD AUTO: 199 K/UL (ref 150–350)
PMV BLD AUTO: 12.5 FL (ref 9.2–12.9)
RBC # BLD AUTO: 4.45 M/UL (ref 4–5.4)
WBC # BLD AUTO: 6.34 K/UL (ref 3.9–12.7)

## 2020-01-16 PROCEDURE — 87340 HEPATITIS B SURFACE AG IA: CPT

## 2020-01-16 PROCEDURE — 85025 COMPLETE CBC W/AUTO DIFF WBC: CPT

## 2020-01-16 PROCEDURE — 86900 BLOOD TYPING SEROLOGIC ABO: CPT

## 2020-01-16 PROCEDURE — 86762 RUBELLA ANTIBODY: CPT

## 2020-01-16 PROCEDURE — 86592 SYPHILIS TEST NON-TREP QUAL: CPT

## 2020-01-16 PROCEDURE — 86703 HIV-1/HIV-2 1 RESULT ANTBDY: CPT

## 2020-01-16 PROCEDURE — 36415 COLL VENOUS BLD VENIPUNCTURE: CPT

## 2020-01-17 LAB
HBV SURFACE AG SERPL QL IA: NEGATIVE
HIV 1+2 AB+HIV1 P24 AG SERPL QL IA: NEGATIVE
RPR SER QL: NORMAL
RUBV IGG SER-ACNC: 47.1 IU/ML
RUBV IGG SER-IMP: REACTIVE

## 2020-01-30 ENCOUNTER — INITIAL PRENATAL (OUTPATIENT)
Dept: OBSTETRICS AND GYNECOLOGY | Facility: CLINIC | Age: 30
End: 2020-01-30
Payer: MEDICAID

## 2020-01-30 ENCOUNTER — PROCEDURE VISIT (OUTPATIENT)
Dept: OBSTETRICS AND GYNECOLOGY | Facility: CLINIC | Age: 30
End: 2020-01-30
Payer: MEDICAID

## 2020-01-30 VITALS
BODY MASS INDEX: 21.74 KG/M2 | SYSTOLIC BLOOD PRESSURE: 110 MMHG | DIASTOLIC BLOOD PRESSURE: 60 MMHG | WEIGHT: 134.69 LBS

## 2020-01-30 DIAGNOSIS — F31.9 BIPOLAR 1 DISORDER: ICD-10-CM

## 2020-01-30 DIAGNOSIS — Z32.01 POSITIVE PREGNANCY TEST: ICD-10-CM

## 2020-01-30 DIAGNOSIS — Z98.891 PREVIOUS CESAREAN SECTION: Primary | ICD-10-CM

## 2020-01-30 DIAGNOSIS — F90.0 ATTENTION DEFICIT HYPERACTIVITY DISORDER (ADHD), PREDOMINANTLY INATTENTIVE TYPE: ICD-10-CM

## 2020-01-30 PROCEDURE — 76801 OB US < 14 WKS SINGLE FETUS: CPT | Mod: 26,S$PBB,, | Performed by: OBSTETRICS & GYNECOLOGY

## 2020-01-30 PROCEDURE — 99999 PR PBB SHADOW E&M-EST. PATIENT-LVL III: ICD-10-PCS | Mod: PBBFAC,,, | Performed by: MIDWIFE

## 2020-01-30 PROCEDURE — 76801 PR US, OB <14WKS, TRANSABD, SINGLE GESTATION: ICD-10-PCS | Mod: 26,S$PBB,, | Performed by: OBSTETRICS & GYNECOLOGY

## 2020-01-30 PROCEDURE — 99213 OFFICE O/P EST LOW 20 MIN: CPT | Mod: PBBFAC | Performed by: MIDWIFE

## 2020-01-30 PROCEDURE — 99204 PR OFFICE/OUTPT VISIT, NEW, LEVL IV, 45-59 MIN: ICD-10-PCS | Mod: TH,S$PBB,, | Performed by: MIDWIFE

## 2020-01-30 PROCEDURE — 76801 OB US < 14 WKS SINGLE FETUS: CPT | Mod: PBBFAC | Performed by: OBSTETRICS & GYNECOLOGY

## 2020-01-30 PROCEDURE — 99999 PR PBB SHADOW E&M-EST. PATIENT-LVL III: CPT | Mod: PBBFAC,,, | Performed by: MIDWIFE

## 2020-01-30 PROCEDURE — 99204 OFFICE O/P NEW MOD 45 MIN: CPT | Mod: TH,S$PBB,, | Performed by: MIDWIFE

## 2020-01-30 NOTE — PROGRESS NOTES
29 y.o. female  at 7w0d   Pt here for Initial OB visit  Doing well without concerns, occasional cramping. Denies VB, lof.   Reports recently was evaluated by psychiatrist Dr. Becky Verdugo. She is weaning off medication. No longer taking Klonopin, has halfed dosages of Lamictal and Zyprexa. Having difficulty stopping Adderall. Reviewed cardiac risks to fetus with Adderall use. Discussed r/b for medication. MARTIN consents for Dr. Verdugo obtained.   TW lbs   Reviewed prenatal labs, Pap smear still in process  Reviewed warning signs, normal FM, pregnancy precautions and how/when to call.  RTC x 1 wk, call or present sooner prn.

## 2020-02-01 LAB
FINAL PATHOLOGIC DIAGNOSIS: NORMAL
Lab: NORMAL

## 2020-04-27 ENCOUNTER — PATIENT MESSAGE (OUTPATIENT)
Dept: OBSTETRICS AND GYNECOLOGY | Facility: CLINIC | Age: 30
End: 2020-04-27

## 2021-04-29 ENCOUNTER — PATIENT MESSAGE (OUTPATIENT)
Dept: RESEARCH | Facility: HOSPITAL | Age: 31
End: 2021-04-29

## (undated) DEVICE — UNDERGLOVE BIOGEL PI SZ 6.5 LF

## (undated) DEVICE — CONTAINER SPECIMEN STRL 4OZ

## (undated) DEVICE — GLOVE SURGICAL LATEX SZ 6.5

## (undated) DEVICE — GLOVE SURGICAL LATEX SZ 7

## (undated) DEVICE — PACK DRAPE PERI/GYN TIBURON

## (undated) DEVICE — LINER GLOVE POWDERFREE SZ 6.5

## (undated) DEVICE — SEE MEDLINE ITEM 157181

## (undated) DEVICE — TUBING COLLECTION PVC D AND C

## (undated) DEVICE — CURRETTE VACCUUM CURVED 8MM

## (undated) DEVICE — COVER OVERHEAD SURG LT BLUE